# Patient Record
Sex: FEMALE | Race: AMERICAN INDIAN OR ALASKA NATIVE | NOT HISPANIC OR LATINO | Employment: UNEMPLOYED | RURAL
[De-identification: names, ages, dates, MRNs, and addresses within clinical notes are randomized per-mention and may not be internally consistent; named-entity substitution may affect disease eponyms.]

---

## 2021-07-13 ENCOUNTER — PROCEDURE VISIT (OUTPATIENT)
Dept: OBSTETRICS AND GYNECOLOGY | Facility: CLINIC | Age: 31
End: 2021-07-13
Payer: MEDICAID

## 2021-07-13 ENCOUNTER — OFFICE VISIT (OUTPATIENT)
Dept: OBSTETRICS AND GYNECOLOGY | Facility: CLINIC | Age: 31
End: 2021-07-13
Payer: MEDICAID

## 2021-07-13 VITALS
DIASTOLIC BLOOD PRESSURE: 83 MMHG | BODY MASS INDEX: 36.36 KG/M2 | SYSTOLIC BLOOD PRESSURE: 130 MMHG | WEIGHT: 254 LBS | TEMPERATURE: 98 F | HEART RATE: 85 BPM | RESPIRATION RATE: 18 BRPM | HEIGHT: 70 IN | OXYGEN SATURATION: 99 %

## 2021-07-13 DIAGNOSIS — R93.89 THICKENED ENDOMETRIUM: Primary | ICD-10-CM

## 2021-07-13 DIAGNOSIS — R10.2 PELVIC PAIN: ICD-10-CM

## 2021-07-13 DIAGNOSIS — R10.2 PELVIC PAIN: Primary | ICD-10-CM

## 2021-07-13 DIAGNOSIS — N76.0 ACUTE VAGINITIS: ICD-10-CM

## 2021-07-13 LAB
CANDIDA SPECIES: NEGATIVE
GARDNERELLA: NEGATIVE
TRICHOMONAS: NEGATIVE

## 2021-07-13 PROCEDURE — 87480 BACTERIAL VAGINOSIS: ICD-10-PCS | Mod: ,,, | Performed by: CLINICAL MEDICAL LABORATORY

## 2021-07-13 PROCEDURE — 87480 CANDIDA DNA DIR PROBE: CPT | Mod: ,,, | Performed by: CLINICAL MEDICAL LABORATORY

## 2021-07-13 PROCEDURE — 76856 US EXAM PELVIC COMPLETE: CPT | Mod: ,,, | Performed by: OBSTETRICS & GYNECOLOGY

## 2021-07-13 PROCEDURE — 87660 TRICHOMONAS VAGIN DIR PROBE: CPT | Mod: ,,, | Performed by: CLINICAL MEDICAL LABORATORY

## 2021-07-13 PROCEDURE — 76856 PR  ECHO,PELVIC (NONOBSTETRIC): ICD-10-PCS | Mod: ,,, | Performed by: OBSTETRICS & GYNECOLOGY

## 2021-07-13 PROCEDURE — 58100 BIOPSY (GYNECOLOGICAL): ICD-10-PCS | Mod: ,,, | Performed by: ADVANCED PRACTICE MIDWIFE

## 2021-07-13 PROCEDURE — 58100 BIOPSY OF UTERUS LINING: CPT | Mod: ,,, | Performed by: ADVANCED PRACTICE MIDWIFE

## 2021-07-13 PROCEDURE — 87510 GARDNER VAG DNA DIR PROBE: CPT | Mod: ,,, | Performed by: CLINICAL MEDICAL LABORATORY

## 2021-07-13 PROCEDURE — 99499 UNLISTED E&M SERVICE: CPT | Mod: ,,, | Performed by: OBSTETRICS & GYNECOLOGY

## 2021-07-13 PROCEDURE — 87491 CHLAMYDIA/GONORRHOEAE(GC), PCR: ICD-10-PCS | Mod: ,,, | Performed by: CLINICAL MEDICAL LABORATORY

## 2021-07-13 PROCEDURE — 88305 TISSUE EXAM BY PATHOLOGIST: CPT | Mod: SUR | Performed by: ADVANCED PRACTICE MIDWIFE

## 2021-07-13 PROCEDURE — 87510 BACTERIAL VAGINOSIS: ICD-10-PCS | Mod: ,,, | Performed by: CLINICAL MEDICAL LABORATORY

## 2021-07-13 PROCEDURE — 87591 CHLAMYDIA/GONORRHOEAE(GC), PCR: ICD-10-PCS | Mod: ,,, | Performed by: CLINICAL MEDICAL LABORATORY

## 2021-07-13 PROCEDURE — 87591 N.GONORRHOEAE DNA AMP PROB: CPT | Mod: ,,, | Performed by: CLINICAL MEDICAL LABORATORY

## 2021-07-13 PROCEDURE — 99499 NO LOS: ICD-10-PCS | Mod: ,,, | Performed by: OBSTETRICS & GYNECOLOGY

## 2021-07-13 PROCEDURE — 88305 SURGICAL PATHOLOGY: ICD-10-PCS | Mod: 26,,, | Performed by: PATHOLOGY

## 2021-07-13 PROCEDURE — 87660 BACTERIAL VAGINOSIS: ICD-10-PCS | Mod: ,,, | Performed by: CLINICAL MEDICAL LABORATORY

## 2021-07-13 PROCEDURE — 87491 CHLMYD TRACH DNA AMP PROBE: CPT | Mod: ,,, | Performed by: CLINICAL MEDICAL LABORATORY

## 2021-07-13 PROCEDURE — 99204 PR OFFICE/OUTPT VISIT, NEW, LEVL IV, 45-59 MIN: ICD-10-PCS | Mod: 25,,, | Performed by: ADVANCED PRACTICE MIDWIFE

## 2021-07-13 PROCEDURE — 99204 OFFICE O/P NEW MOD 45 MIN: CPT | Mod: 25,,, | Performed by: ADVANCED PRACTICE MIDWIFE

## 2021-07-13 PROCEDURE — 88305 TISSUE EXAM BY PATHOLOGIST: CPT | Mod: 26,,, | Performed by: PATHOLOGY

## 2021-07-13 RX ORDER — ESCITALOPRAM OXALATE 10 MG/1
10 TABLET ORAL DAILY
COMMUNITY
End: 2021-10-11 | Stop reason: SDUPTHER

## 2021-07-13 RX ORDER — OMEPRAZOLE 20 MG/1
20 CAPSULE, DELAYED RELEASE ORAL DAILY
COMMUNITY
Start: 2021-06-07 | End: 2021-10-11 | Stop reason: SDUPTHER

## 2021-07-13 RX ORDER — LISINOPRIL 20 MG/1
20 TABLET ORAL DAILY
COMMUNITY
Start: 2021-02-17 | End: 2021-10-11 | Stop reason: SDUPTHER

## 2021-07-13 RX ORDER — DOXYCYCLINE 100 MG/1
100 CAPSULE ORAL 2 TIMES DAILY
Qty: 20 CAPSULE | Refills: 0 | Status: ON HOLD | OUTPATIENT
Start: 2021-07-13 | End: 2021-09-01

## 2021-07-13 RX ORDER — AZITHROMYCIN 500 MG/1
1000 TABLET, FILM COATED ORAL ONCE
Qty: 2 TABLET | Refills: 0 | Status: SHIPPED | OUTPATIENT
Start: 2021-07-13 | End: 2021-07-13

## 2021-07-15 LAB
CHLAMYDIA BY PCR: NEGATIVE
ESTROGEN SERPL-MCNC: NORMAL PG/ML
LAB AP CLINICAL INFORMATION: NORMAL
LAB AP GROSS DESCRIPTION: NORMAL
LAB AP LABORATORY NOTES: NORMAL
N. GONORRHOEAE (GC) BY PCR: NEGATIVE
T3RU NFR SERPL: NORMAL %

## 2021-08-10 ENCOUNTER — TELEPHONE (OUTPATIENT)
Dept: OBSTETRICS AND GYNECOLOGY | Facility: CLINIC | Age: 31
End: 2021-08-10

## 2021-08-10 ENCOUNTER — OFFICE VISIT (OUTPATIENT)
Dept: OBSTETRICS AND GYNECOLOGY | Facility: CLINIC | Age: 31
End: 2021-08-10
Payer: MEDICAID

## 2021-08-10 VITALS
RESPIRATION RATE: 18 BRPM | DIASTOLIC BLOOD PRESSURE: 93 MMHG | OXYGEN SATURATION: 99 % | BODY MASS INDEX: 35.22 KG/M2 | SYSTOLIC BLOOD PRESSURE: 133 MMHG | HEART RATE: 72 BPM | HEIGHT: 70 IN | TEMPERATURE: 98 F | WEIGHT: 246 LBS

## 2021-08-10 DIAGNOSIS — R10.2 PELVIC PAIN: Primary | ICD-10-CM

## 2021-08-10 DIAGNOSIS — N92.0 MENORRHAGIA WITH REGULAR CYCLE: ICD-10-CM

## 2021-08-10 DIAGNOSIS — N94.10 DYSPAREUNIA, FEMALE: ICD-10-CM

## 2021-08-10 PROCEDURE — 99214 OFFICE O/P EST MOD 30 MIN: CPT | Mod: ,,, | Performed by: OBSTETRICS & GYNECOLOGY

## 2021-08-10 PROCEDURE — 99214 PR OFFICE/OUTPT VISIT, EST, LEVL IV, 30-39 MIN: ICD-10-PCS | Mod: ,,, | Performed by: OBSTETRICS & GYNECOLOGY

## 2021-08-10 RX ORDER — SODIUM CHLORIDE 9 MG/ML
INJECTION, SOLUTION INTRAVENOUS CONTINUOUS
Status: CANCELLED | OUTPATIENT
Start: 2021-08-10

## 2021-08-27 ENCOUNTER — OFFICE VISIT (OUTPATIENT)
Dept: CARDIOLOGY | Facility: CLINIC | Age: 31
End: 2021-08-27
Payer: MEDICAID

## 2021-08-27 VITALS
SYSTOLIC BLOOD PRESSURE: 104 MMHG | HEART RATE: 82 BPM | OXYGEN SATURATION: 99 % | HEIGHT: 70 IN | BODY MASS INDEX: 35.07 KG/M2 | WEIGHT: 245 LBS | DIASTOLIC BLOOD PRESSURE: 80 MMHG

## 2021-08-27 DIAGNOSIS — I10 ESSENTIAL HYPERTENSION: Primary | ICD-10-CM

## 2021-08-27 DIAGNOSIS — R06.02 SHORTNESS OF BREATH: ICD-10-CM

## 2021-08-27 PROCEDURE — 99213 OFFICE O/P EST LOW 20 MIN: CPT | Mod: PBBFAC | Performed by: INTERNAL MEDICINE

## 2021-08-27 PROCEDURE — 99214 OFFICE O/P EST MOD 30 MIN: CPT | Mod: S$PBB,,, | Performed by: INTERNAL MEDICINE

## 2021-08-27 PROCEDURE — 93005 ELECTROCARDIOGRAM TRACING: CPT | Mod: PBBFAC | Performed by: INTERNAL MEDICINE

## 2021-08-27 PROCEDURE — 99214 PR OFFICE/OUTPT VISIT, EST, LEVL IV, 30-39 MIN: ICD-10-PCS | Mod: S$PBB,,, | Performed by: INTERNAL MEDICINE

## 2021-08-27 PROCEDURE — 93010 EKG 12-LEAD: ICD-10-PCS | Mod: S$PBB,,, | Performed by: INTERNAL MEDICINE

## 2021-08-27 PROCEDURE — 93010 ELECTROCARDIOGRAM REPORT: CPT | Mod: S$PBB,,, | Performed by: INTERNAL MEDICINE

## 2021-08-31 ENCOUNTER — TELEPHONE (OUTPATIENT)
Dept: OBSTETRICS AND GYNECOLOGY | Facility: CLINIC | Age: 31
End: 2021-08-31

## 2021-08-31 ENCOUNTER — HOSPITAL ENCOUNTER (OUTPATIENT)
Dept: CARDIOLOGY | Facility: HOSPITAL | Age: 31
Discharge: HOME | End: 2021-08-31
Attending: INTERNAL MEDICINE
Payer: MEDICAID

## 2021-08-31 VITALS — WEIGHT: 245 LBS | HEIGHT: 70 IN | BODY MASS INDEX: 35.07 KG/M2

## 2021-08-31 DIAGNOSIS — R93.89 THICKENED ENDOMETRIUM: Primary | ICD-10-CM

## 2021-08-31 DIAGNOSIS — R06.02 SHORTNESS OF BREATH: ICD-10-CM

## 2021-08-31 LAB
AV INDEX (PROSTH): 0.7
AV MEAN GRADIENT: 4 MMHG
AV VALVE AREA: 2.04 CM2
BSA FOR ECHO PROCEDURE: 2.34 M2
CV ECHO LV RWT: 0.35 CM
DOP CALC AO VTI: 26.09 CM
DOP CALC LVOT AREA: 2.9 CM2
DOP CALC LVOT DIAMETER: 1.93 CM
DOP CALC LVOT STROKE VOLUME: 53.19 CM3
DOP CALCLVOT PEAK VEL VTI: 18.19 CM
E WAVE DECELERATION TIME: 229 MSEC
E/A RATIO: 1.54
E/E' RATIO: 6.06 M/S
ECHO LV POSTERIOR WALL: 0.8 CM (ref 0.6–1.1)
EJECTION FRACTION: 55 %
FRACTIONAL SHORTENING: 34 % (ref 28–44)
INTERVENTRICULAR SEPTUM: 0.84 CM (ref 0.6–1.1)
LEFT INTERNAL DIMENSION IN SYSTOLE: 3.01 CM (ref 2.1–4)
LEFT VENTRICLE MASS INDEX: 52 G/M2
LEFT VENTRICULAR INTERNAL DIMENSION IN DIASTOLE: 4.55 CM (ref 3.5–6)
LEFT VENTRICULAR MASS: 119.56 G
LV LATERAL E/E' RATIO: 5.58 M/S
LV SEPTAL E/E' RATIO: 6.63 M/S
LVOT MG: 2 MMHG
MV PEAK A VEL: 0.69 M/S
MV PEAK E VEL: 1.06 M/S
RA PRESSURE: 3 MMHG
RIGHT VENTRICULAR END-DIASTOLIC DIMENSION: 2.61 CM
TDI LATERAL: 0.19 M/S
TDI SEPTAL: 0.16 M/S
TDI: 0.18 M/S
TRICUSPID ANNULAR PLANE SYSTOLIC EXCURSION: 2.32 CM

## 2021-08-31 PROCEDURE — 25500020 PHARM REV CODE 255: Performed by: INTERNAL MEDICINE

## 2021-08-31 PROCEDURE — 93306 ECHO (CUPID ONLY): ICD-10-PCS | Mod: 26,,, | Performed by: INTERNAL MEDICINE

## 2021-08-31 PROCEDURE — C8929 TTE W OR WO FOL WCON,DOPPLER: HCPCS

## 2021-08-31 PROCEDURE — 93306 TTE W/DOPPLER COMPLETE: CPT | Mod: 26,,, | Performed by: INTERNAL MEDICINE

## 2021-08-31 RX ADMIN — PERFLUTREN 1.5 ML: 6.52 INJECTION, SUSPENSION INTRAVENOUS at 09:08

## 2021-09-01 ENCOUNTER — ANESTHESIA (OUTPATIENT)
Dept: SURGERY | Facility: HOSPITAL | Age: 31
End: 2021-09-01
Payer: MEDICAID

## 2021-09-01 ENCOUNTER — HOSPITAL ENCOUNTER (OUTPATIENT)
Facility: HOSPITAL | Age: 31
Discharge: HOME | End: 2021-09-01
Attending: OBSTETRICS & GYNECOLOGY | Admitting: OBSTETRICS & GYNECOLOGY
Payer: MEDICAID

## 2021-09-01 ENCOUNTER — ANESTHESIA EVENT (OUTPATIENT)
Dept: SURGERY | Facility: HOSPITAL | Age: 31
End: 2021-09-01
Payer: MEDICAID

## 2021-09-01 VITALS
HEART RATE: 63 BPM | DIASTOLIC BLOOD PRESSURE: 92 MMHG | BODY MASS INDEX: 34.93 KG/M2 | SYSTOLIC BLOOD PRESSURE: 148 MMHG | TEMPERATURE: 98 F | HEIGHT: 70 IN | OXYGEN SATURATION: 100 % | RESPIRATION RATE: 18 BRPM | WEIGHT: 244 LBS

## 2021-09-01 DIAGNOSIS — N94.10 DYSPAREUNIA, FEMALE: ICD-10-CM

## 2021-09-01 DIAGNOSIS — R10.2 PELVIC PAIN: ICD-10-CM

## 2021-09-01 DIAGNOSIS — Z90.710 S/P LAPAROSCOPIC ASSISTED VAGINAL HYSTERECTOMY (LAVH): Primary | ICD-10-CM

## 2021-09-01 DIAGNOSIS — N92.0 MENORRHAGIA WITH REGULAR CYCLE: ICD-10-CM

## 2021-09-01 LAB
BASOPHILS # BLD AUTO: 0.03 K/UL (ref 0–0.2)
BASOPHILS NFR BLD AUTO: 0.2 % (ref 0–1)
DIFFERENTIAL METHOD BLD: ABNORMAL
EOSINOPHIL # BLD AUTO: 0.01 K/UL (ref 0–0.5)
EOSINOPHIL NFR BLD AUTO: 0.1 % (ref 1–4)
ERYTHROCYTE [DISTWIDTH] IN BLOOD BY AUTOMATED COUNT: 14.3 % (ref 11.5–14.5)
HCT VFR BLD AUTO: 37.9 % (ref 38–47)
HGB BLD-MCNC: 12.2 G/DL (ref 12–16)
IMM GRANULOCYTES # BLD AUTO: 0.07 K/UL (ref 0–0.04)
IMM GRANULOCYTES NFR BLD: 0.5 % (ref 0–0.4)
LYMPHOCYTES # BLD AUTO: 1.7 K/UL (ref 1–4.8)
LYMPHOCYTES NFR BLD AUTO: 11.2 % (ref 27–41)
MCH RBC QN AUTO: 24.9 PG (ref 27–31)
MCHC RBC AUTO-ENTMCNC: 32.2 G/DL (ref 32–36)
MCV RBC AUTO: 77.3 FL (ref 80–96)
MONOCYTES # BLD AUTO: 0.7 K/UL (ref 0–0.8)
MONOCYTES NFR BLD AUTO: 4.6 % (ref 2–6)
MPC BLD CALC-MCNC: 9.9 FL (ref 9.4–12.4)
NEUTROPHILS # BLD AUTO: 12.64 K/UL (ref 1.8–7.7)
NEUTROPHILS NFR BLD AUTO: 83.4 % (ref 53–65)
NRBC # BLD AUTO: 0 X10E3/UL
NRBC, AUTO (.00): 0 %
PLATELET # BLD AUTO: 309 K/UL (ref 150–400)
RBC # BLD AUTO: 4.9 M/UL (ref 4.2–5.4)
WBC # BLD AUTO: 15.15 K/UL (ref 4.5–11)

## 2021-09-01 PROCEDURE — 88307 TISSUE EXAM BY PATHOLOGIST: CPT | Mod: 26,,, | Performed by: PATHOLOGY

## 2021-09-01 PROCEDURE — 36000710: Performed by: OBSTETRICS & GYNECOLOGY

## 2021-09-01 PROCEDURE — 85025 COMPLETE CBC W/AUTO DIFF WBC: CPT | Performed by: OBSTETRICS & GYNECOLOGY

## 2021-09-01 PROCEDURE — 25000003 PHARM REV CODE 250: Performed by: OBSTETRICS & GYNECOLOGY

## 2021-09-01 PROCEDURE — 27000165 HC TUBE, ETT CUFFED: Performed by: ANESTHESIOLOGY

## 2021-09-01 PROCEDURE — 37000008 HC ANESTHESIA 1ST 15 MINUTES: Performed by: OBSTETRICS & GYNECOLOGY

## 2021-09-01 PROCEDURE — 37000009 HC ANESTHESIA EA ADD 15 MINS: Performed by: OBSTETRICS & GYNECOLOGY

## 2021-09-01 PROCEDURE — 25000003 PHARM REV CODE 250: Performed by: ANESTHESIOLOGY

## 2021-09-01 PROCEDURE — 88307 TISSUE EXAM BY PATHOLOGIST: CPT | Mod: SUR | Performed by: OBSTETRICS & GYNECOLOGY

## 2021-09-01 PROCEDURE — 58262 VAG HYST INCLUDING T/O: CPT | Mod: ,,, | Performed by: OBSTETRICS & GYNECOLOGY

## 2021-09-01 PROCEDURE — 36415 COLL VENOUS BLD VENIPUNCTURE: CPT | Performed by: OBSTETRICS & GYNECOLOGY

## 2021-09-01 PROCEDURE — 71000015 HC POSTOP RECOV 1ST HR: Performed by: OBSTETRICS & GYNECOLOGY

## 2021-09-01 PROCEDURE — 63600175 PHARM REV CODE 636 W HCPCS: Performed by: ANESTHESIOLOGY

## 2021-09-01 PROCEDURE — 27000260 *HC AIRWAY ORAL: Performed by: ANESTHESIOLOGY

## 2021-09-01 PROCEDURE — 27201423 OPTIME MED/SURG SUP & DEVICES STERILE SUPPLY: Performed by: OBSTETRICS & GYNECOLOGY

## 2021-09-01 PROCEDURE — C1729 CATH, DRAINAGE: HCPCS | Performed by: OBSTETRICS & GYNECOLOGY

## 2021-09-01 PROCEDURE — D9220A PRA ANESTHESIA: Mod: ANES,,, | Performed by: ANESTHESIOLOGY

## 2021-09-01 PROCEDURE — 88307 SURGICAL PATHOLOGY: ICD-10-PCS | Mod: 26,,, | Performed by: PATHOLOGY

## 2021-09-01 PROCEDURE — 27000689 HC BLADE LARYNGOSCOPE ANY SIZE: Performed by: ANESTHESIOLOGY

## 2021-09-01 PROCEDURE — 36000711: Performed by: OBSTETRICS & GYNECOLOGY

## 2021-09-01 PROCEDURE — D9220A PRA ANESTHESIA: ICD-10-PCS | Mod: CRNA,,, | Performed by: ANESTHESIOLOGY

## 2021-09-01 PROCEDURE — D9220A PRA ANESTHESIA: ICD-10-PCS | Mod: ANES,,, | Performed by: ANESTHESIOLOGY

## 2021-09-01 PROCEDURE — 71000033 HC RECOVERY, INTIAL HOUR: Performed by: OBSTETRICS & GYNECOLOGY

## 2021-09-01 PROCEDURE — 71000016 HC POSTOP RECOV ADDL HR: Performed by: OBSTETRICS & GYNECOLOGY

## 2021-09-01 PROCEDURE — 58262 PR VAG HYST,RMV TUBE/OVARY: ICD-10-PCS | Mod: ,,, | Performed by: OBSTETRICS & GYNECOLOGY

## 2021-09-01 PROCEDURE — D9220A PRA ANESTHESIA: Mod: CRNA,,, | Performed by: ANESTHESIOLOGY

## 2021-09-01 PROCEDURE — 27000716 HC OXISENSOR PROBE, ANY SIZE: Performed by: ANESTHESIOLOGY

## 2021-09-01 RX ORDER — DIPHENHYDRAMINE HYDROCHLORIDE 50 MG/ML
25 INJECTION INTRAMUSCULAR; INTRAVENOUS EVERY 6 HOURS PRN
Status: DISCONTINUED | OUTPATIENT
Start: 2021-09-01 | End: 2021-09-01 | Stop reason: HOSPADM

## 2021-09-01 RX ORDER — MORPHINE SULFATE 10 MG/ML
3 INJECTION INTRAMUSCULAR; INTRAVENOUS; SUBCUTANEOUS
Status: DISCONTINUED | OUTPATIENT
Start: 2021-09-01 | End: 2021-09-01 | Stop reason: HOSPADM

## 2021-09-01 RX ORDER — SODIUM CHLORIDE, SODIUM LACTATE, POTASSIUM CHLORIDE, CALCIUM CHLORIDE 600; 310; 30; 20 MG/100ML; MG/100ML; MG/100ML; MG/100ML
INJECTION, SOLUTION INTRAVENOUS CONTINUOUS
Status: DISCONTINUED | OUTPATIENT
Start: 2021-09-01 | End: 2021-09-01 | Stop reason: HOSPADM

## 2021-09-01 RX ORDER — ONDANSETRON 2 MG/ML
4 INJECTION INTRAMUSCULAR; INTRAVENOUS DAILY PRN
Status: DISCONTINUED | OUTPATIENT
Start: 2021-09-01 | End: 2021-09-01 | Stop reason: HOSPADM

## 2021-09-01 RX ORDER — FENTANYL CITRATE 50 UG/ML
INJECTION, SOLUTION INTRAMUSCULAR; INTRAVENOUS
Status: DISCONTINUED | OUTPATIENT
Start: 2021-09-01 | End: 2021-09-01

## 2021-09-01 RX ORDER — PROCHLORPERAZINE EDISYLATE 5 MG/ML
5 INJECTION INTRAMUSCULAR; INTRAVENOUS EVERY 6 HOURS PRN
Status: DISCONTINUED | OUTPATIENT
Start: 2021-09-01 | End: 2021-09-01 | Stop reason: HOSPADM

## 2021-09-01 RX ORDER — SODIUM CHLORIDE 9 MG/ML
INJECTION, SOLUTION INTRAVENOUS CONTINUOUS
Status: DISCONTINUED | OUTPATIENT
Start: 2021-09-01 | End: 2021-09-01 | Stop reason: HOSPADM

## 2021-09-01 RX ORDER — SIMETHICONE 80 MG
80 TABLET,CHEWABLE ORAL EVERY 4 HOURS PRN
Status: DISCONTINUED | OUTPATIENT
Start: 2021-09-01 | End: 2021-09-01 | Stop reason: HOSPADM

## 2021-09-01 RX ORDER — HYDROMORPHONE HYDROCHLORIDE 2 MG/ML
0.5 INJECTION, SOLUTION INTRAMUSCULAR; INTRAVENOUS; SUBCUTANEOUS EVERY 5 MIN PRN
Status: DISCONTINUED | OUTPATIENT
Start: 2021-09-01 | End: 2021-09-01 | Stop reason: HOSPADM

## 2021-09-01 RX ORDER — CEFAZOLIN SODIUM 2 G/50ML
2 SOLUTION INTRAVENOUS
Status: DISCONTINUED | OUTPATIENT
Start: 2021-09-01 | End: 2021-09-01 | Stop reason: HOSPADM

## 2021-09-01 RX ORDER — ROCURONIUM BROMIDE 50 MG/5 ML
SYRINGE (ML) INTRAVENOUS
Status: DISCONTINUED | OUTPATIENT
Start: 2021-09-01 | End: 2021-09-01

## 2021-09-01 RX ORDER — PROPOFOL 10 MG/ML
VIAL (ML) INTRAVENOUS
Status: DISCONTINUED | OUTPATIENT
Start: 2021-09-01 | End: 2021-09-01

## 2021-09-01 RX ORDER — MIDAZOLAM HYDROCHLORIDE 1 MG/ML
INJECTION INTRAMUSCULAR; INTRAVENOUS
Status: DISCONTINUED | OUTPATIENT
Start: 2021-09-01 | End: 2021-09-01

## 2021-09-01 RX ORDER — HYDROCODONE BITARTRATE AND ACETAMINOPHEN 5; 325 MG/1; MG/1
1 TABLET ORAL EVERY 4 HOURS PRN
Status: DISCONTINUED | OUTPATIENT
Start: 2021-09-01 | End: 2021-09-01 | Stop reason: HOSPADM

## 2021-09-01 RX ORDER — DIPHENHYDRAMINE HCL 25 MG
25 CAPSULE ORAL EVERY 4 HOURS PRN
Status: DISCONTINUED | OUTPATIENT
Start: 2021-09-01 | End: 2021-09-01 | Stop reason: HOSPADM

## 2021-09-01 RX ORDER — MORPHINE SULFATE 10 MG/ML
4 INJECTION INTRAMUSCULAR; INTRAVENOUS; SUBCUTANEOUS EVERY 5 MIN PRN
Status: DISCONTINUED | OUTPATIENT
Start: 2021-09-01 | End: 2021-09-01 | Stop reason: HOSPADM

## 2021-09-01 RX ORDER — DEXAMETHASONE SODIUM PHOSPHATE 4 MG/ML
INJECTION, SOLUTION INTRA-ARTICULAR; INTRALESIONAL; INTRAMUSCULAR; INTRAVENOUS; SOFT TISSUE
Status: DISCONTINUED | OUTPATIENT
Start: 2021-09-01 | End: 2021-09-01

## 2021-09-01 RX ORDER — ONDANSETRON 4 MG/1
8 TABLET, ORALLY DISINTEGRATING ORAL EVERY 8 HOURS PRN
Status: DISCONTINUED | OUTPATIENT
Start: 2021-09-01 | End: 2021-09-01 | Stop reason: HOSPADM

## 2021-09-01 RX ORDER — VASOPRESSIN 20 [USP'U]/ML
INJECTION, SOLUTION INTRAMUSCULAR; SUBCUTANEOUS
Status: DISCONTINUED | OUTPATIENT
Start: 2021-09-01 | End: 2021-09-01 | Stop reason: HOSPADM

## 2021-09-01 RX ORDER — LIDOCAINE HYDROCHLORIDE 20 MG/ML
INJECTION, SOLUTION EPIDURAL; INFILTRATION; INTRACAUDAL; PERINEURAL
Status: DISCONTINUED | OUTPATIENT
Start: 2021-09-01 | End: 2021-09-01

## 2021-09-01 RX ORDER — GLYCOPYRROLATE 0.2 MG/ML
INJECTION INTRAMUSCULAR; INTRAVENOUS
Status: DISCONTINUED | OUTPATIENT
Start: 2021-09-01 | End: 2021-09-01

## 2021-09-01 RX ORDER — MUPIROCIN 20 MG/G
1 OINTMENT TOPICAL 2 TIMES DAILY
Status: DISCONTINUED | OUTPATIENT
Start: 2021-09-01 | End: 2021-09-01 | Stop reason: HOSPADM

## 2021-09-01 RX ORDER — ONDANSETRON 2 MG/ML
INJECTION INTRAMUSCULAR; INTRAVENOUS
Status: DISCONTINUED | OUTPATIENT
Start: 2021-09-01 | End: 2021-09-01

## 2021-09-01 RX ORDER — MEPERIDINE HYDROCHLORIDE 25 MG/ML
25 INJECTION INTRAMUSCULAR; INTRAVENOUS; SUBCUTANEOUS EVERY 10 MIN PRN
Status: DISCONTINUED | OUTPATIENT
Start: 2021-09-01 | End: 2021-09-01 | Stop reason: HOSPADM

## 2021-09-01 RX ORDER — SCOLOPAMINE TRANSDERMAL SYSTEM 1 MG/1
1 PATCH, EXTENDED RELEASE TRANSDERMAL ONCE
Status: DISCONTINUED | OUTPATIENT
Start: 2021-09-01 | End: 2021-09-01 | Stop reason: HOSPADM

## 2021-09-01 RX ORDER — NEOSTIGMINE METHYLSULFATE 1 MG/ML
INJECTION, SOLUTION INTRAVENOUS
Status: DISCONTINUED | OUTPATIENT
Start: 2021-09-01 | End: 2021-09-01

## 2021-09-01 RX ORDER — CEFAZOLIN SODIUM 1 G/3ML
INJECTION, POWDER, FOR SOLUTION INTRAMUSCULAR; INTRAVENOUS
Status: DISCONTINUED | OUTPATIENT
Start: 2021-09-01 | End: 2021-09-01

## 2021-09-01 RX ORDER — HYDROCODONE BITARTRATE AND ACETAMINOPHEN 7.5; 325 MG/1; MG/1
1 TABLET ORAL EVERY 6 HOURS PRN
Qty: 28 TABLET | Refills: 0 | Status: SHIPPED | OUTPATIENT
Start: 2021-09-01 | End: 2022-05-16 | Stop reason: ALTCHOICE

## 2021-09-01 RX ADMIN — NEOSTIGMINE METHYLSULFATE 2 MG: 1 INJECTION INTRAVENOUS at 12:09

## 2021-09-01 RX ADMIN — SODIUM CHLORIDE: 9 INJECTION, SOLUTION INTRAVENOUS at 09:09

## 2021-09-01 RX ADMIN — MORPHINE SULFATE 4 MG: 10 INJECTION INTRAVENOUS at 12:09

## 2021-09-01 RX ADMIN — MORPHINE SULFATE 2 MG: 10 INJECTION INTRAVENOUS at 12:09

## 2021-09-01 RX ADMIN — MIDAZOLAM 2 MG: 1 INJECTION INTRAMUSCULAR; INTRAVENOUS at 09:09

## 2021-09-01 RX ADMIN — ONDANSETRON 4 MG: 2 INJECTION INTRAMUSCULAR; INTRAVENOUS at 09:09

## 2021-09-01 RX ADMIN — FENTANYL CITRATE 50 MCG: 50 INJECTION INTRAMUSCULAR; INTRAVENOUS at 10:09

## 2021-09-01 RX ADMIN — GLYCOPYRROLATE 0.4 MG: 0.2 INJECTION INTRAMUSCULAR; INTRAVENOUS at 12:09

## 2021-09-01 RX ADMIN — CEFAZOLIN 2000 MG: 1 INJECTION, POWDER, FOR SOLUTION INTRAMUSCULAR; INTRAVENOUS; PARENTERAL at 09:09

## 2021-09-01 RX ADMIN — Medication 10 MG: at 10:09

## 2021-09-01 RX ADMIN — PROPOFOL 150 MG: 10 INJECTION, EMULSION INTRAVENOUS at 09:09

## 2021-09-01 RX ADMIN — Medication 40 MG: at 09:09

## 2021-09-01 RX ADMIN — DEXAMETHASONE SODIUM PHOSPHATE 4 MG: 4 INJECTION, SOLUTION INTRA-ARTICULAR; INTRALESIONAL; INTRAMUSCULAR; INTRAVENOUS; SOFT TISSUE at 09:09

## 2021-09-01 RX ADMIN — FENTANYL CITRATE 100 MCG: 50 INJECTION INTRAMUSCULAR; INTRAVENOUS at 09:09

## 2021-09-01 RX ADMIN — LIDOCAINE HYDROCHLORIDE 100 MG: 20 INJECTION, SOLUTION INTRAVENOUS at 09:09

## 2021-09-01 RX ADMIN — SCOPALAMINE 1 PATCH: 1 PATCH, EXTENDED RELEASE TRANSDERMAL at 08:09

## 2021-09-02 LAB
DHEA SERPL-MCNC: NORMAL
ESTROGEN SERPL-MCNC: NORMAL PG/ML
LAB AP GROSS DESCRIPTION: NORMAL
LAB AP LABORATORY NOTES: NORMAL
T3RU NFR SERPL: NORMAL %

## 2021-09-15 ENCOUNTER — OFFICE VISIT (OUTPATIENT)
Dept: OBSTETRICS AND GYNECOLOGY | Facility: CLINIC | Age: 31
End: 2021-09-15
Payer: MEDICAID

## 2021-09-15 VITALS
DIASTOLIC BLOOD PRESSURE: 82 MMHG | OXYGEN SATURATION: 99 % | BODY MASS INDEX: 35.07 KG/M2 | WEIGHT: 245 LBS | HEIGHT: 70 IN | SYSTOLIC BLOOD PRESSURE: 125 MMHG | HEART RATE: 66 BPM | RESPIRATION RATE: 18 BRPM | TEMPERATURE: 98 F

## 2021-09-15 DIAGNOSIS — Z90.710 STATUS POST HYSTERECTOMY: Primary | ICD-10-CM

## 2021-09-15 PROCEDURE — 99024 POSTOP FOLLOW-UP VISIT: CPT | Mod: ,,, | Performed by: ADVANCED PRACTICE MIDWIFE

## 2021-09-15 PROCEDURE — 99024 PR POST-OP FOLLOW-UP VISIT: ICD-10-PCS | Mod: ,,, | Performed by: ADVANCED PRACTICE MIDWIFE

## 2021-10-04 ENCOUNTER — OFFICE VISIT (OUTPATIENT)
Dept: OBSTETRICS AND GYNECOLOGY | Facility: CLINIC | Age: 31
End: 2021-10-04
Payer: MEDICAID

## 2021-10-04 VITALS
WEIGHT: 241 LBS | HEIGHT: 70 IN | HEART RATE: 72 BPM | DIASTOLIC BLOOD PRESSURE: 78 MMHG | BODY MASS INDEX: 34.5 KG/M2 | SYSTOLIC BLOOD PRESSURE: 126 MMHG

## 2021-10-04 DIAGNOSIS — Z90.710 STATUS POST HYSTERECTOMY: Primary | ICD-10-CM

## 2021-10-04 PROCEDURE — 99024 PR POST-OP FOLLOW-UP VISIT: ICD-10-PCS | Mod: ,,, | Performed by: OBSTETRICS & GYNECOLOGY

## 2021-10-04 PROCEDURE — 99024 POSTOP FOLLOW-UP VISIT: CPT | Mod: ,,, | Performed by: OBSTETRICS & GYNECOLOGY

## 2021-10-11 ENCOUNTER — OFFICE VISIT (OUTPATIENT)
Dept: FAMILY MEDICINE | Facility: CLINIC | Age: 31
End: 2021-10-11
Payer: MEDICAID

## 2021-10-11 VITALS
WEIGHT: 239 LBS | HEIGHT: 70 IN | HEART RATE: 73 BPM | DIASTOLIC BLOOD PRESSURE: 84 MMHG | SYSTOLIC BLOOD PRESSURE: 120 MMHG | TEMPERATURE: 97 F | BODY MASS INDEX: 34.22 KG/M2

## 2021-10-11 DIAGNOSIS — R63.4 WEIGHT LOSS: ICD-10-CM

## 2021-10-11 DIAGNOSIS — R63.0 DECREASED APPETITE: ICD-10-CM

## 2021-10-11 DIAGNOSIS — Z83.3 FAMILY HISTORY OF DIABETES MELLITUS: ICD-10-CM

## 2021-10-11 DIAGNOSIS — I10 ESSENTIAL HYPERTENSION: Primary | ICD-10-CM

## 2021-10-11 DIAGNOSIS — F41.9 ANXIETY: ICD-10-CM

## 2021-10-11 DIAGNOSIS — K21.9 GASTROESOPHAGEAL REFLUX DISEASE, UNSPECIFIED WHETHER ESOPHAGITIS PRESENT: ICD-10-CM

## 2021-10-11 DIAGNOSIS — F32.A DEPRESSION, UNSPECIFIED DEPRESSION TYPE: ICD-10-CM

## 2021-10-11 LAB
ALBUMIN SERPL BCP-MCNC: 3.4 G/DL (ref 3.5–5)
ALBUMIN/GLOB SERPL: 0.8 {RATIO}
ALP SERPL-CCNC: 84 U/L (ref 37–98)
ALT SERPL W P-5'-P-CCNC: 25 U/L (ref 13–56)
ANION GAP SERPL CALCULATED.3IONS-SCNC: 9 MMOL/L (ref 7–16)
AST SERPL W P-5'-P-CCNC: 18 U/L (ref 15–37)
BASOPHILS # BLD AUTO: 0.05 K/UL (ref 0–0.2)
BASOPHILS NFR BLD AUTO: 0.6 % (ref 0–1)
BILIRUB SERPL-MCNC: 0.5 MG/DL (ref 0–1.2)
BUN SERPL-MCNC: 9 MG/DL (ref 7–18)
BUN/CREAT SERPL: 9 (ref 6–20)
CALCIUM SERPL-MCNC: 9 MG/DL (ref 8.5–10.1)
CHLORIDE SERPL-SCNC: 109 MMOL/L (ref 98–107)
CHOLEST SERPL-MCNC: 158 MG/DL (ref 0–200)
CHOLEST/HDLC SERPL: 5.1 {RATIO}
CO2 SERPL-SCNC: 27 MMOL/L (ref 21–32)
CREAT SERPL-MCNC: 0.96 MG/DL (ref 0.55–1.02)
DIFFERENTIAL METHOD BLD: ABNORMAL
EOSINOPHIL # BLD AUTO: 0.29 K/UL (ref 0–0.5)
EOSINOPHIL NFR BLD AUTO: 3.3 % (ref 1–4)
ERYTHROCYTE [DISTWIDTH] IN BLOOD BY AUTOMATED COUNT: 14 % (ref 11.5–14.5)
EST. AVERAGE GLUCOSE BLD GHB EST-MCNC: 97 MG/DL
GLOBULIN SER-MCNC: 4.1 G/DL (ref 2–4)
GLUCOSE SERPL-MCNC: 94 MG/DL (ref 74–106)
HBA1C MFR BLD HPLC: 5.5 % (ref 4.5–6.6)
HCT VFR BLD AUTO: 40.4 % (ref 38–47)
HDLC SERPL-MCNC: 31 MG/DL (ref 40–60)
HGB BLD-MCNC: 12.5 G/DL (ref 12–16)
IMM GRANULOCYTES # BLD AUTO: 0.02 K/UL (ref 0–0.04)
IMM GRANULOCYTES NFR BLD: 0.2 % (ref 0–0.4)
LDLC SERPL CALC-MCNC: 106 MG/DL
LDLC/HDLC SERPL: 3.4 {RATIO}
LYMPHOCYTES # BLD AUTO: 2.99 K/UL (ref 1–4.8)
LYMPHOCYTES NFR BLD AUTO: 33.9 % (ref 27–41)
MCH RBC QN AUTO: 24.2 PG (ref 27–31)
MCHC RBC AUTO-ENTMCNC: 30.9 G/DL (ref 32–36)
MCV RBC AUTO: 78.3 FL (ref 80–96)
MONOCYTES # BLD AUTO: 0.53 K/UL (ref 0–0.8)
MONOCYTES NFR BLD AUTO: 6 % (ref 2–6)
MPC BLD CALC-MCNC: 11.2 FL (ref 9.4–12.4)
NEUTROPHILS # BLD AUTO: 4.93 K/UL (ref 1.8–7.7)
NEUTROPHILS NFR BLD AUTO: 56 % (ref 53–65)
NONHDLC SERPL-MCNC: 127 MG/DL
NRBC # BLD AUTO: 0 X10E3/UL
NRBC, AUTO (.00): 0 %
PLATELET # BLD AUTO: 314 K/UL (ref 150–400)
POTASSIUM SERPL-SCNC: 4 MMOL/L (ref 3.5–5.1)
PROT SERPL-MCNC: 7.5 G/DL (ref 6.4–8.2)
RBC # BLD AUTO: 5.16 M/UL (ref 4.2–5.4)
SODIUM SERPL-SCNC: 141 MMOL/L (ref 136–145)
TRIGL SERPL-MCNC: 104 MG/DL (ref 35–150)
TSH SERPL DL<=0.005 MIU/L-ACNC: 1.52 UIU/ML (ref 0.36–3.74)
VLDLC SERPL-MCNC: 21 MG/DL
WBC # BLD AUTO: 8.81 K/UL (ref 4.5–11)

## 2021-10-11 PROCEDURE — 80061 LIPID PANEL: ICD-10-PCS | Mod: ,,, | Performed by: CLINICAL MEDICAL LABORATORY

## 2021-10-11 PROCEDURE — 84443 TSH: ICD-10-PCS | Mod: ,,, | Performed by: CLINICAL MEDICAL LABORATORY

## 2021-10-11 PROCEDURE — 80061 LIPID PANEL: CPT | Mod: ,,, | Performed by: CLINICAL MEDICAL LABORATORY

## 2021-10-11 PROCEDURE — 84443 ASSAY THYROID STIM HORMONE: CPT | Mod: ,,, | Performed by: CLINICAL MEDICAL LABORATORY

## 2021-10-11 PROCEDURE — 85025 COMPLETE CBC W/AUTO DIFF WBC: CPT | Mod: ,,, | Performed by: CLINICAL MEDICAL LABORATORY

## 2021-10-11 PROCEDURE — 99214 PR OFFICE/OUTPT VISIT, EST, LEVL IV, 30-39 MIN: ICD-10-PCS | Mod: ,,, | Performed by: NURSE PRACTITIONER

## 2021-10-11 PROCEDURE — 83036 HEMOGLOBIN A1C: ICD-10-PCS | Mod: ,,, | Performed by: CLINICAL MEDICAL LABORATORY

## 2021-10-11 PROCEDURE — 80053 COMPREHENSIVE METABOLIC PANEL: ICD-10-PCS | Mod: ,,, | Performed by: CLINICAL MEDICAL LABORATORY

## 2021-10-11 PROCEDURE — 85025 CBC WITH DIFFERENTIAL: ICD-10-PCS | Mod: ,,, | Performed by: CLINICAL MEDICAL LABORATORY

## 2021-10-11 PROCEDURE — 83036 HEMOGLOBIN GLYCOSYLATED A1C: CPT | Mod: ,,, | Performed by: CLINICAL MEDICAL LABORATORY

## 2021-10-11 PROCEDURE — 99214 OFFICE O/P EST MOD 30 MIN: CPT | Mod: ,,, | Performed by: NURSE PRACTITIONER

## 2021-10-11 PROCEDURE — 80053 COMPREHEN METABOLIC PANEL: CPT | Mod: ,,, | Performed by: CLINICAL MEDICAL LABORATORY

## 2021-10-11 RX ORDER — OMEPRAZOLE 20 MG/1
20 CAPSULE, DELAYED RELEASE ORAL DAILY
Qty: 90 CAPSULE | Refills: 1 | Status: SHIPPED | OUTPATIENT
Start: 2021-10-11 | End: 2022-10-26 | Stop reason: SDUPTHER

## 2021-10-11 RX ORDER — ESCITALOPRAM OXALATE 10 MG/1
10 TABLET ORAL DAILY
Qty: 90 TABLET | Refills: 1 | Status: SHIPPED | OUTPATIENT
Start: 2021-10-11 | End: 2022-10-26 | Stop reason: SDUPTHER

## 2021-10-11 RX ORDER — LISINOPRIL 20 MG/1
20 TABLET ORAL DAILY
Qty: 90 TABLET | Refills: 1 | Status: SHIPPED | OUTPATIENT
Start: 2021-10-11 | End: 2022-10-26 | Stop reason: SDUPTHER

## 2021-10-12 ENCOUNTER — TELEPHONE (OUTPATIENT)
Dept: FAMILY MEDICINE | Facility: CLINIC | Age: 31
End: 2021-10-12

## 2021-12-03 ENCOUNTER — OFFICE VISIT (OUTPATIENT)
Dept: FAMILY MEDICINE | Facility: CLINIC | Age: 31
End: 2021-12-03
Payer: MEDICAID

## 2021-12-03 VITALS
HEIGHT: 70 IN | WEIGHT: 240 LBS | DIASTOLIC BLOOD PRESSURE: 98 MMHG | TEMPERATURE: 98 F | SYSTOLIC BLOOD PRESSURE: 130 MMHG | BODY MASS INDEX: 34.36 KG/M2 | HEART RATE: 114 BPM

## 2021-12-03 DIAGNOSIS — J32.9 SINUSITIS, UNSPECIFIED CHRONICITY, UNSPECIFIED LOCATION: Primary | ICD-10-CM

## 2021-12-03 PROCEDURE — 99213 PR OFFICE/OUTPT VISIT, EST, LEVL III, 20-29 MIN: ICD-10-PCS | Mod: ,,, | Performed by: NURSE PRACTITIONER

## 2021-12-03 PROCEDURE — 99213 OFFICE O/P EST LOW 20 MIN: CPT | Mod: ,,, | Performed by: NURSE PRACTITIONER

## 2021-12-03 RX ORDER — AMOXICILLIN 500 MG/1
500 TABLET, FILM COATED ORAL EVERY 12 HOURS
Qty: 20 TABLET | Refills: 0 | Status: SHIPPED | OUTPATIENT
Start: 2021-12-03 | End: 2021-12-13

## 2022-05-16 ENCOUNTER — OFFICE VISIT (OUTPATIENT)
Dept: FAMILY MEDICINE | Facility: CLINIC | Age: 32
End: 2022-05-16
Payer: MEDICAID

## 2022-05-16 VITALS
WEIGHT: 246 LBS | RESPIRATION RATE: 14 BRPM | BODY MASS INDEX: 34.44 KG/M2 | HEIGHT: 71 IN | HEART RATE: 87 BPM | DIASTOLIC BLOOD PRESSURE: 110 MMHG | SYSTOLIC BLOOD PRESSURE: 150 MMHG

## 2022-05-16 DIAGNOSIS — H61.22 IMPACTED CERUMEN OF LEFT EAR: Primary | ICD-10-CM

## 2022-05-16 PROCEDURE — 4010F ACE/ARB THERAPY RXD/TAKEN: CPT | Mod: CPTII,,, | Performed by: NURSE PRACTITIONER

## 2022-05-16 PROCEDURE — 3077F SYST BP >= 140 MM HG: CPT | Mod: CPTII,,, | Performed by: NURSE PRACTITIONER

## 2022-05-16 PROCEDURE — 1160F RVW MEDS BY RX/DR IN RCRD: CPT | Mod: CPTII,,, | Performed by: NURSE PRACTITIONER

## 2022-05-16 PROCEDURE — 3008F PR BODY MASS INDEX (BMI) DOCUMENTED: ICD-10-PCS | Mod: CPTII,,, | Performed by: NURSE PRACTITIONER

## 2022-05-16 PROCEDURE — 3077F PR MOST RECENT SYSTOLIC BLOOD PRESSURE >= 140 MM HG: ICD-10-PCS | Mod: CPTII,,, | Performed by: NURSE PRACTITIONER

## 2022-05-16 PROCEDURE — 3080F DIAST BP >= 90 MM HG: CPT | Mod: CPTII,,, | Performed by: NURSE PRACTITIONER

## 2022-05-16 PROCEDURE — 69209 REMOVE IMPACTED EAR WAX UNI: CPT | Mod: LT,,, | Performed by: NURSE PRACTITIONER

## 2022-05-16 PROCEDURE — 1159F MED LIST DOCD IN RCRD: CPT | Mod: CPTII,,, | Performed by: NURSE PRACTITIONER

## 2022-05-16 PROCEDURE — 1159F PR MEDICATION LIST DOCUMENTED IN MEDICAL RECORD: ICD-10-PCS | Mod: CPTII,,, | Performed by: NURSE PRACTITIONER

## 2022-05-16 PROCEDURE — 1160F PR REVIEW ALL MEDS BY PRESCRIBER/CLIN PHARMACIST DOCUMENTED: ICD-10-PCS | Mod: CPTII,,, | Performed by: NURSE PRACTITIONER

## 2022-05-16 PROCEDURE — 69209 PR REMOVAL IMPACTED CERUMEN USING IRRIGATION/LAVAGE, UNILATERAL: ICD-10-PCS | Mod: LT,,, | Performed by: NURSE PRACTITIONER

## 2022-05-16 PROCEDURE — 4010F PR ACE/ARB THEARPY RXD/TAKEN: ICD-10-PCS | Mod: CPTII,,, | Performed by: NURSE PRACTITIONER

## 2022-05-16 PROCEDURE — 3080F PR MOST RECENT DIASTOLIC BLOOD PRESSURE >= 90 MM HG: ICD-10-PCS | Mod: CPTII,,, | Performed by: NURSE PRACTITIONER

## 2022-05-16 PROCEDURE — 3008F BODY MASS INDEX DOCD: CPT | Mod: CPTII,,, | Performed by: NURSE PRACTITIONER

## 2022-05-16 NOTE — PROGRESS NOTES
"  Subjective:       Patient ID: Sophy Orozco is a 31 y.o. female.    Chief Complaint: Ear Fullness (Left ear feels full x 1 week)    HPI     Patient presents c/o left ear feeling stopped up; "like I'm in a drum" for the past one week  Denies any ear pain or drainage    Upon initial inspection, left EAC occluded with dark moist cerumen  NP irrigated left ear using warm water; cerumen easily removed  Patient reported instant resolution of clogged sensation    BP (!) 150/110   Pulse 87   Resp 14   Ht 5' 11" (1.803 m)   Wt 111.6 kg (246 lb)   BMI 34.31 kg/m²     Medication List with Changes/Refills   Current Medications    ESCITALOPRAM OXALATE (LEXAPRO) 10 MG TABLET    Take 1 tablet (10 mg total) by mouth once daily.    LISINOPRIL (PRINIVIL,ZESTRIL) 20 MG TABLET    Take 1 tablet (20 mg total) by mouth once daily.    OMEPRAZOLE (PRILOSEC) 20 MG CAPSULE    Take 1 capsule (20 mg total) by mouth once daily.   Discontinued Medications    HYDROCODONE-ACETAMINOPHEN (NORCO) 7.5-325 MG PER TABLET    Take 1 tablet by mouth every 6 (six) hours as needed for Pain.       Review of Systems   Constitutional: Negative for fever.   HENT: Negative for nasal congestion, ear discharge and ear pain (left ear feels clogged).    Respiratory: Negative for cough.    Cardiovascular: Negative for chest pain.   Neurological: Negative for headaches.         Objective:      Physical Exam  Vitals and nursing note reviewed.   Constitutional:       General: She is not in acute distress.     Appearance: Normal appearance.   HENT:      Head: Normocephalic.      Right Ear: Tympanic membrane, ear canal and external ear normal.      Left Ear: Tympanic membrane, ear canal and external ear normal. There is impacted cerumen.      Nose: Nose normal.   Neck:      Thyroid: No thyromegaly.      Trachea: Trachea normal.   Cardiovascular:      Rate and Rhythm: Normal rate and regular rhythm.      Pulses: Normal pulses.      Heart sounds: Normal heart " sounds.   Pulmonary:      Effort: Pulmonary effort is normal.      Breath sounds: Normal breath sounds.   Musculoskeletal:      Cervical back: Neck supple.   Skin:     General: Skin is warm and dry.   Neurological:      General: No focal deficit present.      Mental Status: She is alert and oriented to person, place, and time.   Psychiatric:         Mood and Affect: Mood normal.         Behavior: Behavior normal.         Assessment:       1. Impacted cerumen of left ear        Plan:       There are no Patient Instructions on file for this visit.  Impacted cerumen of left ear     I have personally reviewed the encounter note and agree with the assessment and plan as put forth by the nurse practitioner.  Dr. Rahul Cheng M.D.

## 2022-10-11 ENCOUNTER — OFFICE VISIT (OUTPATIENT)
Dept: OBSTETRICS AND GYNECOLOGY | Facility: CLINIC | Age: 32
End: 2022-10-11
Payer: MEDICAID

## 2022-10-11 VITALS
BODY MASS INDEX: 35.31 KG/M2 | DIASTOLIC BLOOD PRESSURE: 93 MMHG | SYSTOLIC BLOOD PRESSURE: 142 MMHG | WEIGHT: 253.19 LBS | HEART RATE: 72 BPM

## 2022-10-11 DIAGNOSIS — Z12.72 SPECIAL SCREENING FOR MALIGNANT NEOPLASMS, VAGINA: Primary | ICD-10-CM

## 2022-10-11 DIAGNOSIS — N95.1 MENOPAUSAL SYMPTOM: ICD-10-CM

## 2022-10-11 DIAGNOSIS — R61 NIGHT SWEATS: ICD-10-CM

## 2022-10-11 DIAGNOSIS — Z01.419 ROUTINE GYNECOLOGICAL EXAMINATION: ICD-10-CM

## 2022-10-11 PROCEDURE — 3008F BODY MASS INDEX DOCD: CPT | Mod: CPTII,,, | Performed by: OBSTETRICS & GYNECOLOGY

## 2022-10-11 PROCEDURE — 3008F PR BODY MASS INDEX (BMI) DOCUMENTED: ICD-10-PCS | Mod: CPTII,,, | Performed by: OBSTETRICS & GYNECOLOGY

## 2022-10-11 PROCEDURE — 87624 HUMAN PAPILLOMAVIRUS (HPV): ICD-10-PCS | Mod: ,,, | Performed by: CLINICAL MEDICAL LABORATORY

## 2022-10-11 PROCEDURE — 1159F PR MEDICATION LIST DOCUMENTED IN MEDICAL RECORD: ICD-10-PCS | Mod: CPTII,,, | Performed by: OBSTETRICS & GYNECOLOGY

## 2022-10-11 PROCEDURE — 4010F ACE/ARB THERAPY RXD/TAKEN: CPT | Mod: CPTII,,, | Performed by: OBSTETRICS & GYNECOLOGY

## 2022-10-11 PROCEDURE — 99395 PREV VISIT EST AGE 18-39: CPT | Mod: ,,, | Performed by: OBSTETRICS & GYNECOLOGY

## 2022-10-11 PROCEDURE — 3077F PR MOST RECENT SYSTOLIC BLOOD PRESSURE >= 140 MM HG: ICD-10-PCS | Mod: CPTII,,, | Performed by: OBSTETRICS & GYNECOLOGY

## 2022-10-11 PROCEDURE — 3077F SYST BP >= 140 MM HG: CPT | Mod: CPTII,,, | Performed by: OBSTETRICS & GYNECOLOGY

## 2022-10-11 PROCEDURE — 3080F DIAST BP >= 90 MM HG: CPT | Mod: CPTII,,, | Performed by: OBSTETRICS & GYNECOLOGY

## 2022-10-11 PROCEDURE — 4010F PR ACE/ARB THEARPY RXD/TAKEN: ICD-10-PCS | Mod: CPTII,,, | Performed by: OBSTETRICS & GYNECOLOGY

## 2022-10-11 PROCEDURE — 99395 PR PREVENTIVE VISIT,EST,18-39: ICD-10-PCS | Mod: ,,, | Performed by: OBSTETRICS & GYNECOLOGY

## 2022-10-11 PROCEDURE — 87624 HPV HI-RISK TYP POOLED RSLT: CPT | Mod: ,,, | Performed by: CLINICAL MEDICAL LABORATORY

## 2022-10-11 PROCEDURE — 1159F MED LIST DOCD IN RCRD: CPT | Mod: CPTII,,, | Performed by: OBSTETRICS & GYNECOLOGY

## 2022-10-11 PROCEDURE — 88142 CYTOPATH C/V THIN LAYER: CPT | Mod: GCY | Performed by: OBSTETRICS & GYNECOLOGY

## 2022-10-11 PROCEDURE — 3080F PR MOST RECENT DIASTOLIC BLOOD PRESSURE >= 90 MM HG: ICD-10-PCS | Mod: CPTII,,, | Performed by: OBSTETRICS & GYNECOLOGY

## 2022-10-11 NOTE — PROGRESS NOTES
CC: Well woman exam    Sophy Orozco is a 32 y.o. female  presents for well woman exam.    LMP: Patient's last menstrual period was 2021 (exact date)..    Last mammogram: N/A  Last Colonoscopy: N/A    Pt complains of hot flashes.     Past Medical History:   Diagnosis Date    Anxiety     Depression     Depression 10/11/2021    History of ovarian cyst     Hypertension      Past Surgical History:   Procedure Laterality Date     SECTION       x 1    CHOLECYSTECTOMY      LAPAROSCOPIC SALPINGO-OOPHORECTOMY Bilateral 2021    Procedure: SALPINGO-OOPHORECTOMY, LAPAROSCOPIC;  Surgeon: Bryce Juares MD;  Location: [facility];  Service: OB/GYN;  Laterality: Bilateral;    LAPAROSCOPIC TOTAL HYSTERECTOMY Bilateral 2021    Procedure: HYSTERECTOMY, TOTAL VAGINAL LAPAROSCOPIC;  Surgeon: Bryce Juares MD;  Location: [facility] OR;  Service: OB/GYN;  Laterality: Bilateral;  vaginal - vpath     TUBAL LIGATION  2014     Social History     Socioeconomic History    Marital status:    Tobacco Use    Smoking status: Every Day     Packs/day: 0.25     Types: Cigars, Cigarettes    Smokeless tobacco: Never   Substance and Sexual Activity    Alcohol use: Yes     Comment: Social    Drug use: Yes     Types: Marijuana    Sexual activity: Yes     Partners: Male     Birth control/protection: See Surgical Hx     Family History   Problem Relation Age of Onset    Ovarian cancer Maternal Grandmother     Hypertension Father     Diabetes Father     Heart disease Father     Atrial fibrillation Father     Diabetes Mother     Hypertension Mother     Heart disease Mother     Breast cancer Son     Breast cancer Son      OB History          2    Para   2    Term   2            AB        Living   2         SAB        IAB        Ectopic        Multiple        Live Births   2                 BP (!) 142/93   Pulse 72   Wt 114.9 kg (253 lb 3.2 oz)   LMP 2021 (Exact Date)    BMI 35.31 kg/m²       ROS:  GENERAL: Denies weight gain or weight loss. Feeling well overall.   SKIN: Denies rash or lesions.   HEAD: Denies head injury or headache.   NODES: Denies enlarged lymph nodes.   CHEST: Denies chest pain or shortness of breath.   CARDIOVASCULAR: Denies palpitations or left sided chest pain.   ABDOMEN: No abdominal pain, constipation, diarrhea, nausea, vomiting or rectal bleeding.   URINARY: No frequency, dysuria, hematuria, or burning on urination.  REPRODUCTIVE: See HPI.   BREASTS: The patient performs breast self-examination and denies pain, lumps, or nipple discharge.   HEMATOLOGIC: No easy bruisability or excessive bleeding.   MUSCULOSKELETAL: Denies joint pain or swelling.   NEUROLOGIC: Denies syncope or weakness.   PSYCHIATRIC: Denies depression, anxiety or mood swings.    PHYSICAL EXAM:  APPEARANCE: Well nourished, well developed, in no acute distress.  AFFECT: WNL, alert and oriented x 3  SKIN: No acne or hirsutism  NECK: Neck symmetric without masses or thyromegaly  NODES: No inguinal, cervical, axillary, or femoral lymph node enlargement  CHEST: Good respiratory effect  ABDOMEN: Soft.  No tenderness or masses.  No hepatosplenomegaly.  No hernias.  BREASTS: Symmetrical, no skin changes or visible lesions.  No palpable masses, nipple discharge bilaterally.  PELVIC: Normal external genitalia without lesions.  Normal hair distribution.  Adequate perineal body, normal urethral meatus.  Vagina moist and well rugated without lesions or discharge.  Cervix  and uterus surgically absent. Adnexa without masses or tenderness.    EXTREMITIES: No edema.    Special screening for malignant neoplasms, vagina  -     ThinPrep Pap Test; Future; Expected date: 10/11/2022    Routine gynecological examination  -     ThinPrep Pap Test; Future; Expected date: 10/11/2022    Night sweats  -     estrogens, conjugated, (PREMARIN) 0.625 MG tablet; Take 1 tablet (0.625 mg total) by mouth once daily.  Dispense:  30 tablet; Refill: 11    Menopausal symptom  -     estrogens, conjugated, (PREMARIN) 0.625 MG tablet; Take 1 tablet (0.625 mg total) by mouth once daily.  Dispense: 30 tablet; Refill: 11          Patient was counseled today on A.C.S. Pap guidelines and recommendations for yearly pelvic exams, mammograms and monthly self breast exams; to see her PCP for other health maintenance.   Exercise regimen encouraged  Healthy food choices encouraged  Questions answered to desired level of satisfaction  Verbalized understanding to all information and instructions    Follow up in about 3 months (around 1/11/2023) for FU- 3 months, 1 year annual.      Bryce Juares M.D., FCOG    OB/GYN

## 2022-10-13 LAB
GH SERPL-MCNC: NORMAL NG/ML
HPV 16: NEGATIVE
HPV 18: NEGATIVE
HPV OTHER: NEGATIVE
INSULIN SERPL-ACNC: NORMAL U[IU]/ML
LAB AP CLINICAL INFORMATION: NORMAL
LAB AP GYN INTERPRETATION: NEGATIVE
LAB AP PAP DISCLAIMER COMMENTS: NORMAL
RENIN PLAS-CCNC: NORMAL NG/ML/H

## 2022-10-26 ENCOUNTER — OFFICE VISIT (OUTPATIENT)
Dept: FAMILY MEDICINE | Facility: CLINIC | Age: 32
End: 2022-10-26
Payer: MEDICAID

## 2022-10-26 VITALS
WEIGHT: 250 LBS | HEART RATE: 87 BPM | BODY MASS INDEX: 35 KG/M2 | SYSTOLIC BLOOD PRESSURE: 149 MMHG | HEIGHT: 71 IN | OXYGEN SATURATION: 98 % | DIASTOLIC BLOOD PRESSURE: 100 MMHG

## 2022-10-26 DIAGNOSIS — F41.9 ANXIETY: ICD-10-CM

## 2022-10-26 DIAGNOSIS — I10 ESSENTIAL HYPERTENSION: Primary | ICD-10-CM

## 2022-10-26 DIAGNOSIS — Z13.1 SCREENING FOR DIABETES MELLITUS: ICD-10-CM

## 2022-10-26 DIAGNOSIS — Z13.220 SCREENING FOR HYPERLIPIDEMIA: ICD-10-CM

## 2022-10-26 DIAGNOSIS — K21.9 GASTROESOPHAGEAL REFLUX DISEASE, UNSPECIFIED WHETHER ESOPHAGITIS PRESENT: ICD-10-CM

## 2022-10-26 DIAGNOSIS — Z13.29 SCREENING FOR HYPOTHYROIDISM: ICD-10-CM

## 2022-10-26 DIAGNOSIS — F32.A DEPRESSION, UNSPECIFIED DEPRESSION TYPE: ICD-10-CM

## 2022-10-26 LAB
ALBUMIN SERPL BCP-MCNC: 3.6 G/DL (ref 3.5–5)
ALBUMIN/GLOB SERPL: 1 {RATIO}
ALP SERPL-CCNC: 99 U/L (ref 37–98)
ALT SERPL W P-5'-P-CCNC: 22 U/L (ref 13–56)
ANION GAP SERPL CALCULATED.3IONS-SCNC: 12 MMOL/L (ref 7–16)
AST SERPL W P-5'-P-CCNC: 12 U/L (ref 15–37)
BASOPHILS # BLD AUTO: 0.05 K/UL (ref 0–0.2)
BASOPHILS NFR BLD AUTO: 0.7 % (ref 0–1)
BILIRUB SERPL-MCNC: 0.4 MG/DL (ref ?–1.2)
BUN SERPL-MCNC: 12 MG/DL (ref 7–18)
BUN/CREAT SERPL: 13 (ref 6–20)
CALCIUM SERPL-MCNC: 9 MG/DL (ref 8.5–10.1)
CHLORIDE SERPL-SCNC: 107 MMOL/L (ref 98–107)
CHOLEST SERPL-MCNC: 189 MG/DL (ref 0–200)
CHOLEST/HDLC SERPL: 5.4 {RATIO}
CO2 SERPL-SCNC: 26 MMOL/L (ref 21–32)
CREAT SERPL-MCNC: 0.92 MG/DL (ref 0.55–1.02)
DIFFERENTIAL METHOD BLD: ABNORMAL
EGFR (NO RACE VARIABLE) (RUSH/TITUS): 84 ML/MIN/1.73M²
EOSINOPHIL # BLD AUTO: 0.15 K/UL (ref 0–0.5)
EOSINOPHIL NFR BLD AUTO: 2 % (ref 1–4)
ERYTHROCYTE [DISTWIDTH] IN BLOOD BY AUTOMATED COUNT: 13.9 % (ref 11.5–14.5)
EST. AVERAGE GLUCOSE BLD GHB EST-MCNC: 117 MG/DL
GLOBULIN SER-MCNC: 3.6 G/DL (ref 2–4)
GLUCOSE SERPL-MCNC: 110 MG/DL (ref 74–106)
HBA1C MFR BLD HPLC: 6.1 % (ref 4.5–6.6)
HCT VFR BLD AUTO: 44.6 % (ref 38–47)
HDLC SERPL-MCNC: 35 MG/DL (ref 40–60)
HGB BLD-MCNC: 14.4 G/DL (ref 12–16)
IMM GRANULOCYTES # BLD AUTO: 0.02 K/UL (ref 0–0.04)
IMM GRANULOCYTES NFR BLD: 0.3 % (ref 0–0.4)
LDLC SERPL CALC-MCNC: 128 MG/DL
LDLC/HDLC SERPL: 3.7 {RATIO}
LYMPHOCYTES # BLD AUTO: 2.28 K/UL (ref 1–4.8)
LYMPHOCYTES NFR BLD AUTO: 29.7 % (ref 27–41)
MCH RBC QN AUTO: 26.8 PG (ref 27–31)
MCHC RBC AUTO-ENTMCNC: 32.3 G/DL (ref 32–36)
MCV RBC AUTO: 83.1 FL (ref 80–96)
MONOCYTES # BLD AUTO: 0.48 K/UL (ref 0–0.8)
MONOCYTES NFR BLD AUTO: 6.3 % (ref 2–6)
MPC BLD CALC-MCNC: 11 FL (ref 9.4–12.4)
NEUTROPHILS # BLD AUTO: 4.69 K/UL (ref 1.8–7.7)
NEUTROPHILS NFR BLD AUTO: 61 % (ref 53–65)
NONHDLC SERPL-MCNC: 154 MG/DL
NRBC # BLD AUTO: 0 X10E3/UL
NRBC, AUTO (.00): 0 %
PLATELET # BLD AUTO: 316 K/UL (ref 150–400)
POTASSIUM SERPL-SCNC: 4 MMOL/L (ref 3.5–5.1)
PROT SERPL-MCNC: 7.2 G/DL (ref 6.4–8.2)
RBC # BLD AUTO: 5.37 M/UL (ref 4.2–5.4)
SODIUM SERPL-SCNC: 141 MMOL/L (ref 136–145)
TRIGL SERPL-MCNC: 130 MG/DL (ref 35–150)
TSH SERPL DL<=0.005 MIU/L-ACNC: 2.18 UIU/ML (ref 0.36–3.74)
VLDLC SERPL-MCNC: 26 MG/DL
WBC # BLD AUTO: 7.67 K/UL (ref 4.5–11)

## 2022-10-26 PROCEDURE — 84443 TSH: ICD-10-PCS | Mod: ,,, | Performed by: CLINICAL MEDICAL LABORATORY

## 2022-10-26 PROCEDURE — 1159F MED LIST DOCD IN RCRD: CPT | Mod: CPTII,,, | Performed by: NURSE PRACTITIONER

## 2022-10-26 PROCEDURE — 1160F PR REVIEW ALL MEDS BY PRESCRIBER/CLIN PHARMACIST DOCUMENTED: ICD-10-PCS | Mod: CPTII,,, | Performed by: NURSE PRACTITIONER

## 2022-10-26 PROCEDURE — 85025 CBC WITH DIFFERENTIAL: ICD-10-PCS | Mod: ,,, | Performed by: CLINICAL MEDICAL LABORATORY

## 2022-10-26 PROCEDURE — 83036 HEMOGLOBIN GLYCOSYLATED A1C: CPT | Mod: ,,, | Performed by: CLINICAL MEDICAL LABORATORY

## 2022-10-26 PROCEDURE — 4010F ACE/ARB THERAPY RXD/TAKEN: CPT | Mod: CPTII,,, | Performed by: NURSE PRACTITIONER

## 2022-10-26 PROCEDURE — 85025 COMPLETE CBC W/AUTO DIFF WBC: CPT | Mod: ,,, | Performed by: CLINICAL MEDICAL LABORATORY

## 2022-10-26 PROCEDURE — 80061 LIPID PANEL: ICD-10-PCS | Mod: ,,, | Performed by: CLINICAL MEDICAL LABORATORY

## 2022-10-26 PROCEDURE — 4010F PR ACE/ARB THEARPY RXD/TAKEN: ICD-10-PCS | Mod: CPTII,,, | Performed by: NURSE PRACTITIONER

## 2022-10-26 PROCEDURE — 80053 COMPREHENSIVE METABOLIC PANEL: ICD-10-PCS | Mod: ,,, | Performed by: CLINICAL MEDICAL LABORATORY

## 2022-10-26 PROCEDURE — 1159F PR MEDICATION LIST DOCUMENTED IN MEDICAL RECORD: ICD-10-PCS | Mod: CPTII,,, | Performed by: NURSE PRACTITIONER

## 2022-10-26 PROCEDURE — 99214 OFFICE O/P EST MOD 30 MIN: CPT | Mod: ,,, | Performed by: NURSE PRACTITIONER

## 2022-10-26 PROCEDURE — 3080F PR MOST RECENT DIASTOLIC BLOOD PRESSURE >= 90 MM HG: ICD-10-PCS | Mod: CPTII,,, | Performed by: NURSE PRACTITIONER

## 2022-10-26 PROCEDURE — 1160F RVW MEDS BY RX/DR IN RCRD: CPT | Mod: CPTII,,, | Performed by: NURSE PRACTITIONER

## 2022-10-26 PROCEDURE — 3077F SYST BP >= 140 MM HG: CPT | Mod: CPTII,,, | Performed by: NURSE PRACTITIONER

## 2022-10-26 PROCEDURE — 84443 ASSAY THYROID STIM HORMONE: CPT | Mod: ,,, | Performed by: CLINICAL MEDICAL LABORATORY

## 2022-10-26 PROCEDURE — 99214 PR OFFICE/OUTPT VISIT, EST, LEVL IV, 30-39 MIN: ICD-10-PCS | Mod: ,,, | Performed by: NURSE PRACTITIONER

## 2022-10-26 PROCEDURE — 80061 LIPID PANEL: CPT | Mod: ,,, | Performed by: CLINICAL MEDICAL LABORATORY

## 2022-10-26 PROCEDURE — 80053 COMPREHEN METABOLIC PANEL: CPT | Mod: ,,, | Performed by: CLINICAL MEDICAL LABORATORY

## 2022-10-26 PROCEDURE — 3077F PR MOST RECENT SYSTOLIC BLOOD PRESSURE >= 140 MM HG: ICD-10-PCS | Mod: CPTII,,, | Performed by: NURSE PRACTITIONER

## 2022-10-26 PROCEDURE — 83036 HEMOGLOBIN A1C: ICD-10-PCS | Mod: ,,, | Performed by: CLINICAL MEDICAL LABORATORY

## 2022-10-26 PROCEDURE — 3080F DIAST BP >= 90 MM HG: CPT | Mod: CPTII,,, | Performed by: NURSE PRACTITIONER

## 2022-10-26 RX ORDER — ESCITALOPRAM OXALATE 10 MG/1
10 TABLET ORAL DAILY
Qty: 90 TABLET | Refills: 1 | Status: SHIPPED | OUTPATIENT
Start: 2022-10-26 | End: 2023-02-09

## 2022-10-26 RX ORDER — LISINOPRIL 20 MG/1
20 TABLET ORAL DAILY
Qty: 90 TABLET | Refills: 1 | Status: SHIPPED | OUTPATIENT
Start: 2022-10-26 | End: 2023-02-09 | Stop reason: SDUPTHER

## 2022-10-26 RX ORDER — HYDROCHLOROTHIAZIDE 12.5 MG/1
12.5 TABLET ORAL DAILY
Qty: 90 TABLET | Refills: 0 | Status: SHIPPED | OUTPATIENT
Start: 2022-10-26 | End: 2023-02-09 | Stop reason: SDUPTHER

## 2022-10-26 RX ORDER — OMEPRAZOLE 20 MG/1
20 CAPSULE, DELAYED RELEASE ORAL DAILY
Qty: 90 CAPSULE | Refills: 1 | Status: SHIPPED | OUTPATIENT
Start: 2022-10-26 | End: 2023-02-09 | Stop reason: SDUPTHER

## 2022-10-26 NOTE — PATIENT INSTRUCTIONS
Lab obtained in clinic today, we will notify you of results and any necessary changes to plan of care   Refills on routine medications   Continue current medications and   Start HCTZ 12.5 mg take one tablet daily   Decrease salt and caffeine intake   Check blood pressure in the morning and again in the afternoon, keep written log and bring in to follow up visit   Follow up in two weeks

## 2022-10-26 NOTE — PROGRESS NOTES
"Clinic note     Patient name: Sophy Orozco is a 32 y.o. female   Chief compliant   Chief Complaint   Patient presents with    Medication Refill     No problems at this time, states she just need refills on meds.       Subjective     History of present illness   In clinic for routine follow up and medication refills   She recently had follow up appointment with Dr Pedersen, her blood pressure was elevated during visit and she was instructed to follow up with PCP  She is a daily smoker  Hx of HTN, anxiety and depression with symptoms well controlled on current medication  Currently on lisinopril 20 mg daily, discussed adding HCTZ 12.5 mg daily, dosing and possible side effects discussed, she agrees with plan of care   Encouraged to monitor blood pressure at home and keep written log  Discussed decreasing intake of sodium and caffeine, understanding verbalized          Social History     Tobacco Use    Smoking status: Every Day     Packs/day: 0.25     Types: Cigars, Cigarettes    Smokeless tobacco: Never    Tobacco comments:     Trying to "cut back"    Substance Use Topics    Alcohol use: Yes     Comment: Social    Drug use: Yes     Types: Marijuana       Review of patient's allergies indicates:   Allergen Reactions    Clindamycin Hives     swelling       Past Medical History:   Diagnosis Date    Anxiety     Depression     Depression 10/11/2021    History of ovarian cyst     Hypertension        Past Surgical History:   Procedure Laterality Date     SECTION       x 1    CHOLECYSTECTOMY      LAPAROSCOPIC SALPINGO-OOPHORECTOMY Bilateral 2021    Procedure: SALPINGO-OOPHORECTOMY, LAPAROSCOPIC;  Surgeon: Bryce Juares MD;  Location: [facility];  Service: OB/GYN;  Laterality: Bilateral;    LAPAROSCOPIC TOTAL HYSTERECTOMY Bilateral 2021    Procedure: HYSTERECTOMY, TOTAL VAGINAL LAPAROSCOPIC;  Surgeon: Bryce Juares MD;  Location: [facility] OR;  Service: OB/GYN;  Laterality: " "Bilateral;  vaginal - vpath     TUBAL LIGATION  12/2014        Family History   Problem Relation Age of Onset    Ovarian cancer Maternal Grandmother     Hypertension Father     Diabetes Father     Heart disease Father     Atrial fibrillation Father     Diabetes Mother     Hypertension Mother     Heart disease Mother     Breast cancer Son     Breast cancer Son          Current Outpatient Medications:     EScitalopram oxalate (LEXAPRO) 10 MG tablet, Take 1 tablet (10 mg total) by mouth once daily., Disp: 90 tablet, Rfl: 1    estrogens, conjugated, (PREMARIN) 0.625 MG tablet, Take 1 tablet (0.625 mg total) by mouth once daily., Disp: 30 tablet, Rfl: 11    hydroCHLOROthiazide (HYDRODIURIL) 12.5 MG Tab, Take 1 tablet (12.5 mg total) by mouth once daily., Disp: 90 tablet, Rfl: 0    lisinopriL (PRINIVIL,ZESTRIL) 20 MG tablet, Take 1 tablet (20 mg total) by mouth once daily., Disp: 90 tablet, Rfl: 1    omeprazole (PRILOSEC) 20 MG capsule, Take 1 capsule (20 mg total) by mouth once daily., Disp: 90 capsule, Rfl: 1    Review of Systems   Constitutional:  Negative for appetite change, chills, fatigue and fever.   Eyes:  Negative for visual disturbance.   Respiratory:  Negative for cough and shortness of breath.    Cardiovascular:  Negative for chest pain, palpitations and leg swelling.   Gastrointestinal:  Negative for abdominal pain, change in bowel habit, constipation, diarrhea, nausea, vomiting and change in bowel habit.   Endocrine: Negative for polydipsia and polyuria.   Genitourinary:  Negative for dysuria.   Musculoskeletal:  Negative for arthralgias, gait problem and myalgias.   Integumentary:  Negative for wound.   Neurological:  Positive for headaches. Negative for dizziness, syncope and light-headedness.   Psychiatric/Behavioral:  Negative for confusion, dysphoric mood and sleep disturbance. The patient is not nervous/anxious.      Objective     BP (!) 149/100   Pulse 87   Ht 5' 11" (1.803 m)   Wt 113.4 kg (250 " lb)   LMP 08/25/2021 (Exact Date)   SpO2 98%   BMI 34.87 kg/m²     Physical Exam   Constitutional: She is oriented to person, place, and time. No distress.   HENT:   Head: Atraumatic.   Mouth/Throat: Mucous membranes are moist.   Eyes: Pupils are equal, round, and reactive to light. Conjunctivae are normal.   Cardiovascular: Normal rate and regular rhythm. Pulmonary:      Effort: Pulmonary effort is normal. No respiratory distress.      Breath sounds: Normal breath sounds. No wheezing, rhonchi or rales.     Abdominal: Soft. Bowel sounds are normal. She exhibits no distension. There is no abdominal tenderness.   Musculoskeletal:         General: Normal range of motion.      Cervical back: Neck supple.      Right lower leg: No edema.      Left lower leg: No edema.   Neurological: She is alert and oriented to person, place, and time. Gait normal.   Skin: Skin is warm and dry.   Psychiatric: Her behavior is normal. Mood normal.     Lab Results   Component Value Date    WBC 8.81 10/11/2021    HGB 12.5 10/11/2021    HCT 40.4 10/11/2021    MCV 78.3 (L) 10/11/2021     10/11/2021       CMP  Sodium   Date Value Ref Range Status   10/11/2021 141 136 - 145 mmol/L Final     Potassium   Date Value Ref Range Status   10/11/2021 4.0 3.5 - 5.1 mmol/L Final     Chloride   Date Value Ref Range Status   10/11/2021 109 (H) 98 - 107 mmol/L Final     CO2   Date Value Ref Range Status   10/11/2021 27 21 - 32 mmol/L Final     Glucose   Date Value Ref Range Status   10/11/2021 94 74 - 106 mg/dL Final     BUN   Date Value Ref Range Status   10/11/2021 9 7 - 18 mg/dL Final     Creatinine   Date Value Ref Range Status   10/11/2021 0.96 0.55 - 1.02 mg/dL Final     Calcium   Date Value Ref Range Status   10/11/2021 9.0 8.5 - 10.1 mg/dL Final     Total Protein   Date Value Ref Range Status   10/11/2021 7.5 6.4 - 8.2 g/dL Final     Albumin   Date Value Ref Range Status   10/11/2021 3.4 (L) 3.5 - 5.0 g/dL Final     Bilirubin, Total   Date  Value Ref Range Status   10/11/2021 0.5 >0.0 - 1.2 mg/dL Final     Alk Phos   Date Value Ref Range Status   10/11/2021 84 37 - 98 U/L Final     AST   Date Value Ref Range Status   10/11/2021 18 15 - 37 U/L Final     ALT   Date Value Ref Range Status   10/11/2021 25 13 - 56 U/L Final     Anion Gap   Date Value Ref Range Status   10/11/2021 9 7 - 16 mmol/L Final     eGFR   Date Value Ref Range Status   10/11/2021 71 >=60 mL/min/1.73m² Final     Lab Results   Component Value Date    TSH 1.520 10/11/2021     Lab Results   Component Value Date    CHOL 158 10/11/2021     Lab Results   Component Value Date    HDL 31 (L) 10/11/2021     Lab Results   Component Value Date    LDLCALC 106 10/11/2021     Lab Results   Component Value Date    TRIG 104 10/11/2021     Lab Results   Component Value Date    CHOLHDL 5.1 10/11/2021     Lab Results   Component Value Date    HGBA1C 5.5 10/11/2021         Assessment and Plan   Essential hypertension  -     lisinopriL (PRINIVIL,ZESTRIL) 20 MG tablet; Take 1 tablet (20 mg total) by mouth once daily.  Dispense: 90 tablet; Refill: 1  -     CBC Auto Differential; Future; Expected date: 10/26/2022  -     Comprehensive Metabolic Panel; Future; Expected date: 10/26/2022  -     hydroCHLOROthiazide (HYDRODIURIL) 12.5 MG Tab; Take 1 tablet (12.5 mg total) by mouth once daily.  Dispense: 90 tablet; Refill: 0    Anxiety  -     EScitalopram oxalate (LEXAPRO) 10 MG tablet; Take 1 tablet (10 mg total) by mouth once daily.  Dispense: 90 tablet; Refill: 1    Depression, unspecified depression type  -     EScitalopram oxalate (LEXAPRO) 10 MG tablet; Take 1 tablet (10 mg total) by mouth once daily.  Dispense: 90 tablet; Refill: 1    Gastroesophageal reflux disease, unspecified whether esophagitis present  -     omeprazole (PRILOSEC) 20 MG capsule; Take 1 capsule (20 mg total) by mouth once daily.  Dispense: 90 capsule; Refill: 1    BMI 34.0-34.9,adult    Screening for hypothyroidism  -     TSH; Future;  Expected date: 10/26/2022    Screening for diabetes mellitus  -     Hemoglobin A1C; Future; Expected date: 10/26/2022    Screening for hyperlipidemia  -     Lipid Panel; Future; Expected date: 10/26/2022        Patient Instructions  Patient Instructions   Lab obtained in clinic today, we will notify you of results and any necessary changes to plan of care   Refills on routine medications   Continue current medications and   Start HCTZ 12.5 mg take one tablet daily   Decrease salt and caffeine intake   Check blood pressure in the morning and again in the afternoon, keep written log and bring in to follow up visit   Follow up in two weeks

## 2022-11-09 ENCOUNTER — OFFICE VISIT (OUTPATIENT)
Dept: FAMILY MEDICINE | Facility: CLINIC | Age: 32
End: 2022-11-09
Payer: MEDICAID

## 2022-11-09 VITALS
HEIGHT: 71 IN | DIASTOLIC BLOOD PRESSURE: 76 MMHG | SYSTOLIC BLOOD PRESSURE: 118 MMHG | BODY MASS INDEX: 32.9 KG/M2 | HEART RATE: 88 BPM | WEIGHT: 235 LBS | TEMPERATURE: 98 F | OXYGEN SATURATION: 98 %

## 2022-11-09 DIAGNOSIS — F17.200 CURRENT EVERY DAY SMOKER: ICD-10-CM

## 2022-11-09 DIAGNOSIS — F41.9 ANXIETY: ICD-10-CM

## 2022-11-09 DIAGNOSIS — R73.09 ELEVATED HEMOGLOBIN A1C: ICD-10-CM

## 2022-11-09 DIAGNOSIS — I10 ESSENTIAL HYPERTENSION: Primary | ICD-10-CM

## 2022-11-09 DIAGNOSIS — F32.A DEPRESSION, UNSPECIFIED DEPRESSION TYPE: ICD-10-CM

## 2022-11-09 PROCEDURE — 3008F PR BODY MASS INDEX (BMI) DOCUMENTED: ICD-10-PCS | Mod: CPTII,,, | Performed by: NURSE PRACTITIONER

## 2022-11-09 PROCEDURE — 3044F PR MOST RECENT HEMOGLOBIN A1C LEVEL <7.0%: ICD-10-PCS | Mod: CPTII,,, | Performed by: NURSE PRACTITIONER

## 2022-11-09 PROCEDURE — 99212 PR OFFICE/OUTPT VISIT, EST, LEVL II, 10-19 MIN: ICD-10-PCS | Mod: ,,, | Performed by: NURSE PRACTITIONER

## 2022-11-09 PROCEDURE — 4010F ACE/ARB THERAPY RXD/TAKEN: CPT | Mod: CPTII,,, | Performed by: NURSE PRACTITIONER

## 2022-11-09 PROCEDURE — 3078F DIAST BP <80 MM HG: CPT | Mod: CPTII,,, | Performed by: NURSE PRACTITIONER

## 2022-11-09 PROCEDURE — 3074F PR MOST RECENT SYSTOLIC BLOOD PRESSURE < 130 MM HG: ICD-10-PCS | Mod: CPTII,,, | Performed by: NURSE PRACTITIONER

## 2022-11-09 PROCEDURE — 1159F MED LIST DOCD IN RCRD: CPT | Mod: CPTII,,, | Performed by: NURSE PRACTITIONER

## 2022-11-09 PROCEDURE — 1159F PR MEDICATION LIST DOCUMENTED IN MEDICAL RECORD: ICD-10-PCS | Mod: CPTII,,, | Performed by: NURSE PRACTITIONER

## 2022-11-09 PROCEDURE — 1160F PR REVIEW ALL MEDS BY PRESCRIBER/CLIN PHARMACIST DOCUMENTED: ICD-10-PCS | Mod: CPTII,,, | Performed by: NURSE PRACTITIONER

## 2022-11-09 PROCEDURE — 3074F SYST BP LT 130 MM HG: CPT | Mod: CPTII,,, | Performed by: NURSE PRACTITIONER

## 2022-11-09 PROCEDURE — 3044F HG A1C LEVEL LT 7.0%: CPT | Mod: CPTII,,, | Performed by: NURSE PRACTITIONER

## 2022-11-09 PROCEDURE — 3008F BODY MASS INDEX DOCD: CPT | Mod: CPTII,,, | Performed by: NURSE PRACTITIONER

## 2022-11-09 PROCEDURE — 3078F PR MOST RECENT DIASTOLIC BLOOD PRESSURE < 80 MM HG: ICD-10-PCS | Mod: CPTII,,, | Performed by: NURSE PRACTITIONER

## 2022-11-09 PROCEDURE — 4010F PR ACE/ARB THEARPY RXD/TAKEN: ICD-10-PCS | Mod: CPTII,,, | Performed by: NURSE PRACTITIONER

## 2022-11-09 PROCEDURE — 1160F RVW MEDS BY RX/DR IN RCRD: CPT | Mod: CPTII,,, | Performed by: NURSE PRACTITIONER

## 2022-11-09 PROCEDURE — 99212 OFFICE O/P EST SF 10 MIN: CPT | Mod: ,,, | Performed by: NURSE PRACTITIONER

## 2022-11-09 NOTE — PROGRESS NOTES
"Clinic note     Patient name: Sophy Orozco is a 32 y.o. female   Chief compliant   Chief Complaint   Patient presents with    Hypertension     Follow up on b/p. No problems today.        Subjective     History of present illness   In clinic for routine follow up after medication changes were made last office visit, HCTZ 12.5 mg daily was added, tolerating medication without problems   Hx of HTN, anxiety and depression with symptoms well controlled on current medication  Screening A1c was 6.1, discussed plate method diet, exercise and weight loss plan, all questions addressed, printed information provided; will recheck a1c in three months   Encouraged to monitor blood pressure at home and keep written log  She is a daily smoker       Social History     Tobacco Use    Smoking status: Every Day     Packs/day: 0.25     Types: Cigars, Cigarettes    Smokeless tobacco: Never    Tobacco comments:     Trying to "cut back"    Substance Use Topics    Alcohol use: Yes     Comment: Social    Drug use: Yes     Types: Marijuana       Review of patient's allergies indicates:   Allergen Reactions    Clindamycin Hives     swelling       Past Medical History:   Diagnosis Date    Anxiety     Depression     Depression 10/11/2021    History of ovarian cyst     Hypertension        Past Surgical History:   Procedure Laterality Date     SECTION       x 1    CHOLECYSTECTOMY      LAPAROSCOPIC SALPINGO-OOPHORECTOMY Bilateral 2021    Procedure: SALPINGO-OOPHORECTOMY, LAPAROSCOPIC;  Surgeon: Bryce Juares MD;  Location: [facility];  Service: OB/GYN;  Laterality: Bilateral;    LAPAROSCOPIC TOTAL HYSTERECTOMY Bilateral 2021    Procedure: HYSTERECTOMY, TOTAL VAGINAL LAPAROSCOPIC;  Surgeon: Bryce Juares MD;  Location: [facility] OR;  Service: OB/GYN;  Laterality: Bilateral;  vaginal - vpath     TUBAL LIGATION  2014        Family History   Problem Relation Age of Onset    Ovarian cancer Maternal " "Grandmother     Hypertension Father     Diabetes Father     Heart disease Father     Atrial fibrillation Father     Diabetes Mother     Hypertension Mother     Heart disease Mother     Breast cancer Son     Breast cancer Son          Current Outpatient Medications:     EScitalopram oxalate (LEXAPRO) 10 MG tablet, Take 1 tablet (10 mg total) by mouth once daily., Disp: 90 tablet, Rfl: 1    estrogens, conjugated, (PREMARIN) 0.625 MG tablet, Take 1 tablet (0.625 mg total) by mouth once daily., Disp: 30 tablet, Rfl: 11    hydroCHLOROthiazide (HYDRODIURIL) 12.5 MG Tab, Take 1 tablet (12.5 mg total) by mouth once daily., Disp: 90 tablet, Rfl: 0    lisinopriL (PRINIVIL,ZESTRIL) 20 MG tablet, Take 1 tablet (20 mg total) by mouth once daily., Disp: 90 tablet, Rfl: 1    omeprazole (PRILOSEC) 20 MG capsule, Take 1 capsule (20 mg total) by mouth once daily., Disp: 90 capsule, Rfl: 1    Review of Systems   Constitutional:  Negative for appetite change, chills, fatigue, fever and unexpected weight change.   Eyes:  Negative for visual disturbance.   Respiratory:  Negative for cough and shortness of breath.    Cardiovascular:  Negative for chest pain, palpitations and leg swelling.   Gastrointestinal:  Negative for abdominal pain, change in bowel habit, constipation, diarrhea, nausea, vomiting and change in bowel habit.   Endocrine: Negative for polydipsia and polyuria.   Genitourinary:  Negative for dysuria and frequency.   Musculoskeletal:  Negative for arthralgias, gait problem and myalgias.   Neurological:  Negative for dizziness, syncope, light-headedness and headaches.   Psychiatric/Behavioral:  Negative for confusion, dysphoric mood and sleep disturbance. The patient is not nervous/anxious.      Objective     /76   Pulse 88   Temp 97.9 °F (36.6 °C)   Ht 5' 11" (1.803 m)   Wt 106.6 kg (235 lb)   LMP 08/25/2021 (Exact Date)   SpO2 98%   BMI 32.78 kg/m²     Physical Exam   Constitutional: She is oriented to person, " place, and time. No distress.   HENT:   Head: Atraumatic.   Mouth/Throat: Mucous membranes are moist.   Eyes: Pupils are equal, round, and reactive to light. Conjunctivae are normal.   Cardiovascular: Normal rate and regular rhythm. Pulmonary:      Effort: Pulmonary effort is normal. No respiratory distress.      Breath sounds: Normal breath sounds. No wheezing, rhonchi or rales.     Abdominal: Soft. Bowel sounds are normal. She exhibits no distension. There is no abdominal tenderness.   Musculoskeletal:         General: Normal range of motion.      Cervical back: Neck supple.   Neurological: She is alert and oriented to person, place, and time. Gait normal.   Skin: Skin is warm and dry.   Psychiatric: Her behavior is normal. Mood normal.     Lab Results   Component Value Date    WBC 7.67 10/26/2022    HGB 14.4 10/26/2022    HCT 44.6 10/26/2022    MCV 83.1 10/26/2022     10/26/2022       CMP  Sodium   Date Value Ref Range Status   10/26/2022 141 136 - 145 mmol/L Final     Potassium   Date Value Ref Range Status   10/26/2022 4.0 3.5 - 5.1 mmol/L Final     Chloride   Date Value Ref Range Status   10/26/2022 107 98 - 107 mmol/L Final     CO2   Date Value Ref Range Status   10/26/2022 26 21 - 32 mmol/L Final     Glucose   Date Value Ref Range Status   10/26/2022 110 (H) 74 - 106 mg/dL Final     BUN   Date Value Ref Range Status   10/26/2022 12 7 - 18 mg/dL Final     Creatinine   Date Value Ref Range Status   10/26/2022 0.92 0.55 - 1.02 mg/dL Final     Calcium   Date Value Ref Range Status   10/26/2022 9.0 8.5 - 10.1 mg/dL Final     Total Protein   Date Value Ref Range Status   10/26/2022 7.2 6.4 - 8.2 g/dL Final     Albumin   Date Value Ref Range Status   10/26/2022 3.6 3.5 - 5.0 g/dL Final     Bilirubin, Total   Date Value Ref Range Status   10/26/2022 0.4 >0.0 - 1.2 mg/dL Final     Alk Phos   Date Value Ref Range Status   10/26/2022 99 (H) 37 - 98 U/L Final     AST   Date Value Ref Range Status   10/26/2022 12  (L) 15 - 37 U/L Final     ALT   Date Value Ref Range Status   10/26/2022 22 13 - 56 U/L Final     Anion Gap   Date Value Ref Range Status   10/26/2022 12 7 - 16 mmol/L Final     eGFR   Date Value Ref Range Status   10/11/2021 71 >=60 mL/min/1.73m² Final     Lab Results   Component Value Date    TSH 2.180 10/26/2022     Lab Results   Component Value Date    CHOL 189 10/26/2022    CHOL 158 10/11/2021     Lab Results   Component Value Date    HDL 35 (L) 10/26/2022    HDL 31 (L) 10/11/2021     Lab Results   Component Value Date    LDLCALC 128 10/26/2022    LDLCALC 106 10/11/2021     Lab Results   Component Value Date    TRIG 130 10/26/2022    TRIG 104 10/11/2021     Lab Results   Component Value Date    CHOLHDL 5.4 10/26/2022    CHOLHDL 5.1 10/11/2021     Lab Results   Component Value Date    HGBA1C 6.1 10/26/2022         Assessment and Plan   Essential hypertension    Elevated hemoglobin A1c    Depression, unspecified depression type    Anxiety    BMI 32.0-32.9,adult    Current every day smoker        Patient Instructions  Patient Instructions   Plate method discussed  Weight loss and exercise plan discussed   Follow up in three months, will repeat A1c at that time

## 2022-11-09 NOTE — PATIENT INSTRUCTIONS
Plate method discussed  Weight loss and exercise plan discussed   Follow up in three months, will repeat A1c at that time

## 2023-01-10 ENCOUNTER — OFFICE VISIT (OUTPATIENT)
Dept: OBSTETRICS AND GYNECOLOGY | Facility: CLINIC | Age: 33
End: 2023-01-10
Payer: COMMERCIAL

## 2023-01-10 VITALS
BODY MASS INDEX: 35.51 KG/M2 | DIASTOLIC BLOOD PRESSURE: 96 MMHG | WEIGHT: 254.63 LBS | SYSTOLIC BLOOD PRESSURE: 120 MMHG | HEART RATE: 80 BPM

## 2023-01-10 DIAGNOSIS — F32.A DEPRESSION, UNSPECIFIED DEPRESSION TYPE: ICD-10-CM

## 2023-01-10 DIAGNOSIS — N95.1 MENOPAUSAL SYMPTOM: Primary | ICD-10-CM

## 2023-01-10 DIAGNOSIS — F41.9 ANXIETY: ICD-10-CM

## 2023-01-10 PROCEDURE — 3008F PR BODY MASS INDEX (BMI) DOCUMENTED: ICD-10-PCS | Mod: CPTII,,, | Performed by: OBSTETRICS & GYNECOLOGY

## 2023-01-10 PROCEDURE — 4010F ACE/ARB THERAPY RXD/TAKEN: CPT | Mod: CPTII,,, | Performed by: OBSTETRICS & GYNECOLOGY

## 2023-01-10 PROCEDURE — 3080F PR MOST RECENT DIASTOLIC BLOOD PRESSURE >= 90 MM HG: ICD-10-PCS | Mod: CPTII,,, | Performed by: OBSTETRICS & GYNECOLOGY

## 2023-01-10 PROCEDURE — 3074F SYST BP LT 130 MM HG: CPT | Mod: CPTII,,, | Performed by: OBSTETRICS & GYNECOLOGY

## 2023-01-10 PROCEDURE — 3080F DIAST BP >= 90 MM HG: CPT | Mod: CPTII,,, | Performed by: OBSTETRICS & GYNECOLOGY

## 2023-01-10 PROCEDURE — 3074F PR MOST RECENT SYSTOLIC BLOOD PRESSURE < 130 MM HG: ICD-10-PCS | Mod: CPTII,,, | Performed by: OBSTETRICS & GYNECOLOGY

## 2023-01-10 PROCEDURE — 99214 PR OFFICE/OUTPT VISIT, EST, LEVL IV, 30-39 MIN: ICD-10-PCS | Mod: ,,, | Performed by: OBSTETRICS & GYNECOLOGY

## 2023-01-10 PROCEDURE — 3044F PR MOST RECENT HEMOGLOBIN A1C LEVEL <7.0%: ICD-10-PCS | Mod: CPTII,,, | Performed by: OBSTETRICS & GYNECOLOGY

## 2023-01-10 PROCEDURE — 4010F PR ACE/ARB THEARPY RXD/TAKEN: ICD-10-PCS | Mod: CPTII,,, | Performed by: OBSTETRICS & GYNECOLOGY

## 2023-01-10 PROCEDURE — 3044F HG A1C LEVEL LT 7.0%: CPT | Mod: CPTII,,, | Performed by: OBSTETRICS & GYNECOLOGY

## 2023-01-10 PROCEDURE — 99214 OFFICE O/P EST MOD 30 MIN: CPT | Mod: ,,, | Performed by: OBSTETRICS & GYNECOLOGY

## 2023-01-10 PROCEDURE — 3008F BODY MASS INDEX DOCD: CPT | Mod: CPTII,,, | Performed by: OBSTETRICS & GYNECOLOGY

## 2023-01-10 NOTE — PROGRESS NOTES
Subjective:       Patient ID: Sophy Orozco is a 32 y.o. female.    Chief Complaint: Follow-up (3 month f/u from 10/11/22)    This is a 31 y/o female here for follow-up after starting ERT for hot flashes. Pt states that her symptoms are improved and she is satisfied with her medicaiton    Review of Systems   All other systems reviewed and are negative.      Objective:      Physical Exam  Vitals reviewed. Exam conducted with a chaperone present.   Constitutional:       Appearance: Normal appearance.   HENT:      Head: Normocephalic and atraumatic.      Mouth/Throat:      Mouth: Mucous membranes are moist.   Eyes:      Extraocular Movements: Extraocular movements intact.      Pupils: Pupils are equal, round, and reactive to light.   Cardiovascular:      Rate and Rhythm: Normal rate and regular rhythm.      Pulses: Normal pulses.      Heart sounds: Normal heart sounds.   Pulmonary:      Effort: Pulmonary effort is normal.      Breath sounds: Normal breath sounds.   Abdominal:      General: Abdomen is flat. Bowel sounds are normal.      Palpations: Abdomen is soft.   Musculoskeletal:         General: Normal range of motion.      Cervical back: Normal range of motion.   Skin:     General: Skin is warm and dry.   Neurological:      General: No focal deficit present.      Mental Status: She is alert and oriented to person, place, and time.   Psychiatric:         Mood and Affect: Mood normal.         Behavior: Behavior normal.         Thought Content: Thought content normal.         Judgment: Judgment normal.       Assessment:       Problem List Items Addressed This Visit          Psychiatric    Anxiety    Depression     Other Visit Diagnoses       Menopausal symptom    -  Primary            Plan:       Continue Premarin      Pt states that her mood overall is improved as well aas the frequency and intensity of her hot flashes.

## 2023-02-09 ENCOUNTER — OFFICE VISIT (OUTPATIENT)
Dept: FAMILY MEDICINE | Facility: CLINIC | Age: 33
End: 2023-02-09
Payer: MEDICAID

## 2023-02-09 VITALS
DIASTOLIC BLOOD PRESSURE: 87 MMHG | BODY MASS INDEX: 35.56 KG/M2 | SYSTOLIC BLOOD PRESSURE: 124 MMHG | OXYGEN SATURATION: 98 % | WEIGHT: 254 LBS | HEART RATE: 90 BPM | HEIGHT: 71 IN

## 2023-02-09 DIAGNOSIS — F17.210 CIGARETTE NICOTINE DEPENDENCE WITHOUT COMPLICATION: ICD-10-CM

## 2023-02-09 DIAGNOSIS — R73.09 ELEVATED HEMOGLOBIN A1C: Primary | ICD-10-CM

## 2023-02-09 DIAGNOSIS — I10 ESSENTIAL HYPERTENSION: ICD-10-CM

## 2023-02-09 DIAGNOSIS — K21.9 GASTROESOPHAGEAL REFLUX DISEASE, UNSPECIFIED WHETHER ESOPHAGITIS PRESENT: ICD-10-CM

## 2023-02-09 DIAGNOSIS — F17.200 CURRENT EVERY DAY SMOKER: ICD-10-CM

## 2023-02-09 LAB
EST. AVERAGE GLUCOSE BLD GHB EST-MCNC: 127 MG/DL
HBA1C MFR BLD HPLC: 6.4 % (ref 4.5–6.6)

## 2023-02-09 PROCEDURE — 3074F PR MOST RECENT SYSTOLIC BLOOD PRESSURE < 130 MM HG: ICD-10-PCS | Mod: CPTII,,, | Performed by: NURSE PRACTITIONER

## 2023-02-09 PROCEDURE — 3079F PR MOST RECENT DIASTOLIC BLOOD PRESSURE 80-89 MM HG: ICD-10-PCS | Mod: CPTII,,, | Performed by: NURSE PRACTITIONER

## 2023-02-09 PROCEDURE — 1160F RVW MEDS BY RX/DR IN RCRD: CPT | Mod: CPTII,,, | Performed by: NURSE PRACTITIONER

## 2023-02-09 PROCEDURE — 99213 PR OFFICE/OUTPT VISIT, EST, LEVL III, 20-29 MIN: ICD-10-PCS | Mod: ,,, | Performed by: NURSE PRACTITIONER

## 2023-02-09 PROCEDURE — 3079F DIAST BP 80-89 MM HG: CPT | Mod: CPTII,,, | Performed by: NURSE PRACTITIONER

## 2023-02-09 PROCEDURE — 1159F PR MEDICATION LIST DOCUMENTED IN MEDICAL RECORD: ICD-10-PCS | Mod: CPTII,,, | Performed by: NURSE PRACTITIONER

## 2023-02-09 PROCEDURE — 83036 HEMOGLOBIN A1C: ICD-10-PCS | Mod: ,,, | Performed by: CLINICAL MEDICAL LABORATORY

## 2023-02-09 PROCEDURE — 83036 HEMOGLOBIN GLYCOSYLATED A1C: CPT | Mod: ,,, | Performed by: CLINICAL MEDICAL LABORATORY

## 2023-02-09 PROCEDURE — 3008F PR BODY MASS INDEX (BMI) DOCUMENTED: ICD-10-PCS | Mod: CPTII,,, | Performed by: NURSE PRACTITIONER

## 2023-02-09 PROCEDURE — 4010F ACE/ARB THERAPY RXD/TAKEN: CPT | Mod: CPTII,,, | Performed by: NURSE PRACTITIONER

## 2023-02-09 PROCEDURE — 4010F PR ACE/ARB THEARPY RXD/TAKEN: ICD-10-PCS | Mod: CPTII,,, | Performed by: NURSE PRACTITIONER

## 2023-02-09 PROCEDURE — 3074F SYST BP LT 130 MM HG: CPT | Mod: CPTII,,, | Performed by: NURSE PRACTITIONER

## 2023-02-09 PROCEDURE — 3008F BODY MASS INDEX DOCD: CPT | Mod: CPTII,,, | Performed by: NURSE PRACTITIONER

## 2023-02-09 PROCEDURE — 99213 OFFICE O/P EST LOW 20 MIN: CPT | Mod: ,,, | Performed by: NURSE PRACTITIONER

## 2023-02-09 PROCEDURE — 1159F MED LIST DOCD IN RCRD: CPT | Mod: CPTII,,, | Performed by: NURSE PRACTITIONER

## 2023-02-09 PROCEDURE — 1160F PR REVIEW ALL MEDS BY PRESCRIBER/CLIN PHARMACIST DOCUMENTED: ICD-10-PCS | Mod: CPTII,,, | Performed by: NURSE PRACTITIONER

## 2023-02-09 RX ORDER — LISINOPRIL 20 MG/1
20 TABLET ORAL DAILY
Qty: 90 TABLET | Refills: 1 | Status: SHIPPED | OUTPATIENT
Start: 2023-02-09 | End: 2023-08-02 | Stop reason: SDUPTHER

## 2023-02-09 RX ORDER — OMEPRAZOLE 20 MG/1
20 CAPSULE, DELAYED RELEASE ORAL DAILY
Qty: 90 CAPSULE | Refills: 1 | Status: SHIPPED | OUTPATIENT
Start: 2023-02-09 | End: 2023-08-02 | Stop reason: SDUPTHER

## 2023-02-09 RX ORDER — HYDROCHLOROTHIAZIDE 12.5 MG/1
12.5 TABLET ORAL DAILY
Qty: 90 TABLET | Refills: 0 | Status: SHIPPED | OUTPATIENT
Start: 2023-02-09 | End: 2023-08-02 | Stop reason: SDUPTHER

## 2023-02-09 NOTE — PROGRESS NOTES
"Clinic note     Patient name: Sophy Orozco is a 32 y.o. female   Chief compliant   Chief Complaint   Patient presents with    Medication Refill     No problems voiced at this time. Here for med refills and repeat lab.        Subjective     History of present illness   In clinic for routine follow up, lab and medication refills   Denies any acute concerns at present   Hx of HTN, anxiety and depression with symptoms well controlled on current medication  Screening A1c was 6.1, this will be repeated today   She is a daily smoker   Weight is up 19 # since office visit in November      Social History     Tobacco Use    Smoking status: Every Day     Packs/day: 0.25     Types: Cigars, Cigarettes     Passive exposure: Current    Smokeless tobacco: Never    Tobacco comments:     Trying to "cut back"    Substance Use Topics    Alcohol use: Yes     Comment: Social    Drug use: Yes     Types: Marijuana       Review of patient's allergies indicates:   Allergen Reactions    Clindamycin Hives     swelling       Past Medical History:   Diagnosis Date    Anxiety     Depression     Depression 10/11/2021    History of ovarian cyst     Hypertension        Past Surgical History:   Procedure Laterality Date     SECTION       x 1    CHOLECYSTECTOMY      LAPAROSCOPIC SALPINGO-OOPHORECTOMY Bilateral 2021    Procedure: SALPINGO-OOPHORECTOMY, LAPAROSCOPIC;  Surgeon: Bryce Juares MD;  Location: [facility];  Service: OB/GYN;  Laterality: Bilateral;    LAPAROSCOPIC TOTAL HYSTERECTOMY Bilateral 2021    Procedure: HYSTERECTOMY, TOTAL VAGINAL LAPAROSCOPIC;  Surgeon: Bryce Juares MD;  Location: [facility];  Service: OB/GYN;  Laterality: Bilateral;  vaginal - vpath     TUBAL LIGATION  2014        Family History   Problem Relation Age of Onset    Ovarian cancer Maternal Grandmother     Hypertension Father     Diabetes Father     Heart disease Father     Atrial fibrillation Father     Diabetes " "Mother     Hypertension Mother     Heart disease Mother     Breast cancer Son     Breast cancer Son          Current Outpatient Medications:     estrogens, conjugated, (PREMARIN) 0.625 MG tablet, Take 1 tablet (0.625 mg total) by mouth once daily., Disp: 30 tablet, Rfl: 11    hydroCHLOROthiazide (HYDRODIURIL) 12.5 MG Tab, Take 1 tablet (12.5 mg total) by mouth once daily., Disp: 90 tablet, Rfl: 0    lisinopriL (PRINIVIL,ZESTRIL) 20 MG tablet, Take 1 tablet (20 mg total) by mouth once daily., Disp: 90 tablet, Rfl: 1    omeprazole (PRILOSEC) 20 MG capsule, Take 1 capsule (20 mg total) by mouth once daily., Disp: 90 capsule, Rfl: 1    Review of Systems   Constitutional:  Negative for appetite change, chills, fatigue, fever and unexpected weight change.   Eyes:  Negative for visual disturbance.   Respiratory:  Negative for cough and shortness of breath.    Cardiovascular:  Negative for chest pain, palpitations and leg swelling.   Gastrointestinal:  Negative for abdominal pain, change in bowel habit, constipation, diarrhea, nausea, vomiting and change in bowel habit.   Endocrine: Negative for polydipsia and polyuria.   Genitourinary:  Negative for dysuria and frequency.   Musculoskeletal:  Negative for arthralgias, gait problem and myalgias.   Integumentary:  Negative for wound.   Neurological:  Negative for dizziness, syncope, light-headedness and headaches.   Psychiatric/Behavioral:  Negative for confusion, dysphoric mood and sleep disturbance. The patient is not nervous/anxious.      Objective     /87   Pulse 90   Ht 5' 11" (1.803 m)   Wt 115.2 kg (254 lb)   LMP 08/25/2021 (Exact Date)   SpO2 98%   BMI 35.43 kg/m²     Physical Exam   Constitutional: She is oriented to person, place, and time. She appears obese. No distress.   HENT:   Head: Atraumatic.   Mouth/Throat: Mucous membranes are moist.   Cardiovascular: Normal rate and regular rhythm. Pulmonary:      Effort: Pulmonary effort is normal. No " respiratory distress.      Breath sounds: Normal breath sounds. No wheezing, rhonchi or rales.     Abdominal: Soft. Bowel sounds are normal. She exhibits no distension. There is no abdominal tenderness.   Musculoskeletal:         General: Normal range of motion.      Cervical back: Neck supple.      Right lower leg: No edema.      Left lower leg: No edema.   Neurological: She is alert and oriented to person, place, and time. Gait normal.   Skin: Skin is warm and dry.   Psychiatric: Her behavior is normal. Mood normal.     Lab Results   Component Value Date    WBC 7.67 10/26/2022    HGB 14.4 10/26/2022    HCT 44.6 10/26/2022    MCV 83.1 10/26/2022     10/26/2022       CMP  Sodium   Date Value Ref Range Status   10/26/2022 141 136 - 145 mmol/L Final     Potassium   Date Value Ref Range Status   10/26/2022 4.0 3.5 - 5.1 mmol/L Final     Chloride   Date Value Ref Range Status   10/26/2022 107 98 - 107 mmol/L Final     CO2   Date Value Ref Range Status   10/26/2022 26 21 - 32 mmol/L Final     Glucose   Date Value Ref Range Status   10/26/2022 110 (H) 74 - 106 mg/dL Final     BUN   Date Value Ref Range Status   10/26/2022 12 7 - 18 mg/dL Final     Creatinine   Date Value Ref Range Status   10/26/2022 0.92 0.55 - 1.02 mg/dL Final     Calcium   Date Value Ref Range Status   10/26/2022 9.0 8.5 - 10.1 mg/dL Final     Total Protein   Date Value Ref Range Status   10/26/2022 7.2 6.4 - 8.2 g/dL Final     Albumin   Date Value Ref Range Status   10/26/2022 3.6 3.5 - 5.0 g/dL Final     Bilirubin, Total   Date Value Ref Range Status   10/26/2022 0.4 >0.0 - 1.2 mg/dL Final     Alk Phos   Date Value Ref Range Status   10/26/2022 99 (H) 37 - 98 U/L Final     AST   Date Value Ref Range Status   10/26/2022 12 (L) 15 - 37 U/L Final     ALT   Date Value Ref Range Status   10/26/2022 22 13 - 56 U/L Final     Anion Gap   Date Value Ref Range Status   10/26/2022 12 7 - 16 mmol/L Final     eGFR   Date Value Ref Range Status   10/11/2021  71 >=60 mL/min/1.73m² Final     Lab Results   Component Value Date    TSH 2.180 10/26/2022     Lab Results   Component Value Date    CHOL 189 10/26/2022    CHOL 158 10/11/2021     Lab Results   Component Value Date    HDL 35 (L) 10/26/2022    HDL 31 (L) 10/11/2021     Lab Results   Component Value Date    LDLCALC 128 10/26/2022    LDLCALC 106 10/11/2021     Lab Results   Component Value Date    TRIG 130 10/26/2022    TRIG 104 10/11/2021     Lab Results   Component Value Date    CHOLHDL 5.4 10/26/2022    CHOLHDL 5.1 10/11/2021     Lab Results   Component Value Date    HGBA1C 6.1 10/26/2022         Assessment and Plan   Elevated hemoglobin A1c  -     Hemoglobin A1C; Future; Expected date: 02/09/2023    Essential hypertension  -     hydroCHLOROthiazide (HYDRODIURIL) 12.5 MG Tab; Take 1 tablet (12.5 mg total) by mouth once daily.  Dispense: 90 tablet; Refill: 0  -     lisinopriL (PRINIVIL,ZESTRIL) 20 MG tablet; Take 1 tablet (20 mg total) by mouth once daily.  Dispense: 90 tablet; Refill: 1    Gastroesophageal reflux disease, unspecified whether esophagitis present  -     omeprazole (PRILOSEC) 20 MG capsule; Take 1 capsule (20 mg total) by mouth once daily.  Dispense: 90 capsule; Refill: 1    BMI 35.0-35.9,adult    Current every day smoker    Cigarette nicotine dependence without complication      Assistance with smoking cessation was offered, including:  []  Medications  []  Counseling  [x]  Printed Information on Smoking Cessation  []  Referral to a Smoking Cessation Program    Patient was counseled regarding smoking for 3-10 minutes.      Patient Instructions  Patient Instructions   Lab obtained in clinic today, we will notify you of results and any necessary changes to plan of care   Refills on routine medications   Follow up in three months and as needed

## 2023-02-23 ENCOUNTER — OFFICE VISIT (OUTPATIENT)
Dept: FAMILY MEDICINE | Facility: CLINIC | Age: 33
End: 2023-02-23
Payer: COMMERCIAL

## 2023-02-23 VITALS
HEIGHT: 71 IN | HEART RATE: 74 BPM | SYSTOLIC BLOOD PRESSURE: 127 MMHG | OXYGEN SATURATION: 98 % | BODY MASS INDEX: 35.56 KG/M2 | DIASTOLIC BLOOD PRESSURE: 92 MMHG | WEIGHT: 254 LBS

## 2023-02-23 DIAGNOSIS — F17.200 CURRENT EVERY DAY SMOKER: ICD-10-CM

## 2023-02-23 DIAGNOSIS — E78.5 HYPERLIPIDEMIA, UNSPECIFIED HYPERLIPIDEMIA TYPE: ICD-10-CM

## 2023-02-23 DIAGNOSIS — R73.03 PREDIABETES: Primary | ICD-10-CM

## 2023-02-23 DIAGNOSIS — I10 ESSENTIAL HYPERTENSION: ICD-10-CM

## 2023-02-23 DIAGNOSIS — F17.210 CIGARETTE NICOTINE DEPENDENCE WITHOUT COMPLICATION: ICD-10-CM

## 2023-02-23 PROCEDURE — 3074F PR MOST RECENT SYSTOLIC BLOOD PRESSURE < 130 MM HG: ICD-10-PCS | Mod: CPTII,,, | Performed by: NURSE PRACTITIONER

## 2023-02-23 PROCEDURE — 1160F PR REVIEW ALL MEDS BY PRESCRIBER/CLIN PHARMACIST DOCUMENTED: ICD-10-PCS | Mod: CPTII,,, | Performed by: NURSE PRACTITIONER

## 2023-02-23 PROCEDURE — 3080F DIAST BP >= 90 MM HG: CPT | Mod: CPTII,,, | Performed by: NURSE PRACTITIONER

## 2023-02-23 PROCEDURE — 3044F HG A1C LEVEL LT 7.0%: CPT | Mod: CPTII,,, | Performed by: NURSE PRACTITIONER

## 2023-02-23 PROCEDURE — 3044F PR MOST RECENT HEMOGLOBIN A1C LEVEL <7.0%: ICD-10-PCS | Mod: CPTII,,, | Performed by: NURSE PRACTITIONER

## 2023-02-23 PROCEDURE — 1159F MED LIST DOCD IN RCRD: CPT | Mod: CPTII,,, | Performed by: NURSE PRACTITIONER

## 2023-02-23 PROCEDURE — 1159F PR MEDICATION LIST DOCUMENTED IN MEDICAL RECORD: ICD-10-PCS | Mod: CPTII,,, | Performed by: NURSE PRACTITIONER

## 2023-02-23 PROCEDURE — 1160F RVW MEDS BY RX/DR IN RCRD: CPT | Mod: CPTII,,, | Performed by: NURSE PRACTITIONER

## 2023-02-23 PROCEDURE — 3080F PR MOST RECENT DIASTOLIC BLOOD PRESSURE >= 90 MM HG: ICD-10-PCS | Mod: CPTII,,, | Performed by: NURSE PRACTITIONER

## 2023-02-23 PROCEDURE — 99213 PR OFFICE/OUTPT VISIT, EST, LEVL III, 20-29 MIN: ICD-10-PCS | Mod: ,,, | Performed by: NURSE PRACTITIONER

## 2023-02-23 PROCEDURE — 3008F PR BODY MASS INDEX (BMI) DOCUMENTED: ICD-10-PCS | Mod: CPTII,,, | Performed by: NURSE PRACTITIONER

## 2023-02-23 PROCEDURE — 99213 OFFICE O/P EST LOW 20 MIN: CPT | Mod: ,,, | Performed by: NURSE PRACTITIONER

## 2023-02-23 PROCEDURE — 3074F SYST BP LT 130 MM HG: CPT | Mod: CPTII,,, | Performed by: NURSE PRACTITIONER

## 2023-02-23 PROCEDURE — 4010F PR ACE/ARB THEARPY RXD/TAKEN: ICD-10-PCS | Mod: CPTII,,, | Performed by: NURSE PRACTITIONER

## 2023-02-23 PROCEDURE — 4010F ACE/ARB THERAPY RXD/TAKEN: CPT | Mod: CPTII,,, | Performed by: NURSE PRACTITIONER

## 2023-02-23 PROCEDURE — 3008F BODY MASS INDEX DOCD: CPT | Mod: CPTII,,, | Performed by: NURSE PRACTITIONER

## 2023-02-23 RX ORDER — ATORVASTATIN CALCIUM 10 MG/1
10 TABLET, FILM COATED ORAL DAILY
Qty: 90 TABLET | Refills: 0 | Status: SHIPPED | OUTPATIENT
Start: 2023-02-23 | End: 2023-08-02 | Stop reason: SDUPTHER

## 2023-02-23 RX ORDER — SEMAGLUTIDE 1.34 MG/ML
INJECTION, SOLUTION SUBCUTANEOUS
Qty: 3 PEN | Refills: 0 | Status: SHIPPED | OUTPATIENT
Start: 2023-02-23 | End: 2023-05-10

## 2023-02-23 NOTE — PROGRESS NOTES
"Clinic note     Patient name: Sophy Orozco is a 32 y.o. female   Chief compliant   Chief Complaint   Patient presents with    Follow-up    Diabetes     New Dm follow on a1c.        Subjective     History of present illness   In clinic to discuss results of lab obtained on 2023  A1c was 6.4 which indicated new diagnosis of prediabetes  Discussed results, all question and concerns addressed   Discussed plate method diet recommendations and exercise recommendations   Medication options discussed, she denies any history or family history of thyroid cancer or MENs, she denies any history of pancreatitis or gastroparesis   Will trial Ozempic, dosing and possible side effects discussed, understanding verbalized; instructed to RTC after she picks up medication at pharmacy for staff to assist with administration instructions         Social History     Tobacco Use    Smoking status: Every Day     Packs/day: 0.25     Types: Cigars, Cigarettes     Passive exposure: Current    Smokeless tobacco: Never    Tobacco comments:     Trying to "cut back"    Substance Use Topics    Alcohol use: Yes     Comment: Social    Drug use: Yes     Types: Marijuana       Review of patient's allergies indicates:   Allergen Reactions    Clindamycin Hives     swelling       Past Medical History:   Diagnosis Date    Anxiety     Depression     Depression 10/11/2021    History of ovarian cyst     Hypertension        Past Surgical History:   Procedure Laterality Date     SECTION       x 1    CHOLECYSTECTOMY      LAPAROSCOPIC SALPINGO-OOPHORECTOMY Bilateral 2021    Procedure: SALPINGO-OOPHORECTOMY, LAPAROSCOPIC;  Surgeon: Bryce Juares MD;  Location: [facility];  Service: OB/GYN;  Laterality: Bilateral;    LAPAROSCOPIC TOTAL HYSTERECTOMY Bilateral 2021    Procedure: HYSTERECTOMY, TOTAL VAGINAL LAPAROSCOPIC;  Surgeon: Bryce Juares MD;  [facility];  Service: OB/GYN;  Laterality: Bilateral;  " vaginal - vpath     TUBAL LIGATION  12/2014        Family History   Problem Relation Age of Onset    Ovarian cancer Maternal Grandmother     Hypertension Father     Diabetes Father     Heart disease Father     Atrial fibrillation Father     Diabetes Mother     Hypertension Mother     Heart disease Mother     Breast cancer Son     Breast cancer Son          Current Outpatient Medications:     estrogens, conjugated, (PREMARIN) 0.625 MG tablet, Take 1 tablet (0.625 mg total) by mouth once daily., Disp: 30 tablet, Rfl: 11    hydroCHLOROthiazide (HYDRODIURIL) 12.5 MG Tab, Take 1 tablet (12.5 mg total) by mouth once daily., Disp: 90 tablet, Rfl: 0    lisinopriL (PRINIVIL,ZESTRIL) 20 MG tablet, Take 1 tablet (20 mg total) by mouth once daily., Disp: 90 tablet, Rfl: 1    omeprazole (PRILOSEC) 20 MG capsule, Take 1 capsule (20 mg total) by mouth once daily., Disp: 90 capsule, Rfl: 1    atorvastatin (LIPITOR) 10 MG tablet, Take 1 tablet (10 mg total) by mouth once daily., Disp: 90 tablet, Rfl: 0    semaglutide (OZEMPIC) 0.25 mg or 0.5 mg(2 mg/1.5 mL) pen injector, Inject 0.25 mg SC one time weekly for four weeks then increase to 0.5 mg SC one time weekly, Disp: 3 pen, Rfl: 0    Review of Systems   Constitutional:  Negative for appetite change, chills, fatigue, fever and unexpected weight change.   Respiratory:  Negative for cough and shortness of breath.    Cardiovascular:  Negative for chest pain, palpitations and leg swelling.   Gastrointestinal:  Negative for abdominal pain, change in bowel habit, constipation, diarrhea, nausea, vomiting and change in bowel habit.   Endocrine: Negative for polydipsia and polyuria.   Genitourinary:  Negative for dysuria and frequency.   Musculoskeletal:  Negative for arthralgias, gait problem and myalgias.   Neurological:  Negative for dizziness, syncope, light-headedness and headaches.   Psychiatric/Behavioral:  Negative for dysphoric mood and sleep disturbance. The patient is not  "nervous/anxious.      Objective     BP (!) 127/92 (BP Location: Right arm, Patient Position: Sitting)   Pulse 74   Ht 5' 11" (1.803 m)   Wt 115.2 kg (254 lb)   LMP 08/25/2021 (Exact Date)   SpO2 98%   BMI 35.43 kg/m²     Physical Exam   Constitutional: She is oriented to person, place, and time. No distress.   HENT:   Head: Atraumatic.   Mouth/Throat: Mucous membranes are moist.   Cardiovascular: Normal rate and regular rhythm. Pulmonary:      Effort: Pulmonary effort is normal. No respiratory distress.      Breath sounds: Normal breath sounds. No wheezing, rhonchi or rales.     Abdominal: Soft. Bowel sounds are normal. She exhibits no distension. There is no abdominal tenderness.   Musculoskeletal:         General: Normal range of motion.      Cervical back: Neck supple.      Right lower leg: No edema.      Left lower leg: No edema.   Neurological: She is alert and oriented to person, place, and time. Gait normal.   Skin: Skin is warm and dry.   Psychiatric: Her behavior is normal. Mood normal.     Lab Results   Component Value Date    WBC 7.67 10/26/2022    HGB 14.4 10/26/2022    HCT 44.6 10/26/2022    MCV 83.1 10/26/2022     10/26/2022       CMP  Sodium   Date Value Ref Range Status   10/26/2022 141 136 - 145 mmol/L Final     Potassium   Date Value Ref Range Status   10/26/2022 4.0 3.5 - 5.1 mmol/L Final     Chloride   Date Value Ref Range Status   10/26/2022 107 98 - 107 mmol/L Final     CO2   Date Value Ref Range Status   10/26/2022 26 21 - 32 mmol/L Final     Glucose   Date Value Ref Range Status   10/26/2022 110 (H) 74 - 106 mg/dL Final     BUN   Date Value Ref Range Status   10/26/2022 12 7 - 18 mg/dL Final     Creatinine   Date Value Ref Range Status   10/26/2022 0.92 0.55 - 1.02 mg/dL Final     Calcium   Date Value Ref Range Status   10/26/2022 9.0 8.5 - 10.1 mg/dL Final     Total Protein   Date Value Ref Range Status   10/26/2022 7.2 6.4 - 8.2 g/dL Final     Albumin   Date Value Ref Range " Status   10/26/2022 3.6 3.5 - 5.0 g/dL Final     Bilirubin, Total   Date Value Ref Range Status   10/26/2022 0.4 >0.0 - 1.2 mg/dL Final     Alk Phos   Date Value Ref Range Status   10/26/2022 99 (H) 37 - 98 U/L Final     AST   Date Value Ref Range Status   10/26/2022 12 (L) 15 - 37 U/L Final     ALT   Date Value Ref Range Status   10/26/2022 22 13 - 56 U/L Final     Anion Gap   Date Value Ref Range Status   10/26/2022 12 7 - 16 mmol/L Final     eGFR   Date Value Ref Range Status   10/11/2021 71 >=60 mL/min/1.73m² Final     Lab Results   Component Value Date    TSH 2.180 10/26/2022     Lab Results   Component Value Date    CHOL 189 10/26/2022    CHOL 158 10/11/2021     Lab Results   Component Value Date    HDL 35 (L) 10/26/2022    HDL 31 (L) 10/11/2021     Lab Results   Component Value Date    LDLCALC 128 10/26/2022    LDLCALC 106 10/11/2021     Lab Results   Component Value Date    TRIG 130 10/26/2022    TRIG 104 10/11/2021     Lab Results   Component Value Date    CHOLHDL 5.4 10/26/2022    CHOLHDL 5.1 10/11/2021     Lab Results   Component Value Date    HGBA1C 6.4 02/09/2023         Assessment and Plan   Prediabetes  -     semaglutide (OZEMPIC) 0.25 mg or 0.5 mg(2 mg/1.5 mL) pen injector; Inject 0.25 mg SC one time weekly for four weeks then increase to 0.5 mg SC one time weekly  Dispense: 3 pen; Refill: 0    Essential hypertension    Hyperlipidemia, unspecified hyperlipidemia type  -     atorvastatin (LIPITOR) 10 MG tablet; Take 1 tablet (10 mg total) by mouth once daily.  Dispense: 90 tablet; Refill: 0    BMI 35.0-35.9,adult    Current every day smoker    Cigarette nicotine dependence without complication    Assistance with smoking cessation was offered, including:  []  Medications  []  Counseling  [x]  Printed Information on Smoking Cessation  []  Referral to a Smoking Cessation Program            Patient Instructions  Patient Instructions   Educational handouts to pt: What is prediabetes, Reading a nutrition  facts label, Foot care for people with diabetes, Low blood sugar, High blood sugar, Building a balanced meal the plate method, Know your numbers     Start Ozempic, when you  medication from pharmacy, come by clinic and staff will assist with administration   Start Lipitor as prescribed, take at bedtime     Follow up in clinic in one month from starting Ozempic       Ensure to take medications as directed.      Follow diabetic diet as directed.      Work to achieve normal body weight.     Ensure to exercise 4-5 times per week for 20 minutes.

## 2023-02-23 NOTE — PATIENT INSTRUCTIONS
Educational handouts to pt: What is prediabetes, Reading a nutrition facts label, Foot care for people with diabetes, Low blood sugar, High blood sugar, Building a balanced meal the plate method, Know your numbers     Start Ozempic, when you  medication from pharmacy, come by clinic and staff will assist with administration   Start Lipitor as prescribed, take at bedtime     Follow up in clinic in one month from starting Ozempic       Ensure to take medications as directed.      Follow diabetic diet as directed.      Work to achieve normal body weight.     Ensure to exercise 4-5 times per week for 20 minutes.

## 2023-05-08 PROBLEM — E78.5 HYPERLIPIDEMIA: Status: ACTIVE | Noted: 2023-05-08

## 2023-05-08 PROBLEM — R73.03 PREDIABETES: Status: ACTIVE | Noted: 2023-05-08

## 2023-05-10 ENCOUNTER — TELEPHONE (OUTPATIENT)
Dept: FAMILY MEDICINE | Facility: CLINIC | Age: 33
End: 2023-05-10
Payer: MEDICAID

## 2023-05-10 DIAGNOSIS — R73.03 PREDIABETES: Primary | ICD-10-CM

## 2023-05-10 RX ORDER — LIRAGLUTIDE 6 MG/ML
INJECTION SUBCUTANEOUS
Qty: 6 ML | Refills: 2 | Status: SHIPPED | OUTPATIENT
Start: 2023-05-10 | End: 2023-08-02 | Stop reason: SDUPTHER

## 2023-05-10 NOTE — TELEPHONE ENCOUNTER
Pt called to state that her insurance denied her PA for Ozempic. Pt states she is willing to try Victoza if you will send her something in to [facility]. Please advise and I will call pt back to discuss your decision. Thanks

## 2023-05-10 NOTE — TELEPHONE ENCOUNTER
Rx for victoza sent to pharmacy, insurance may not approve for prediabetes if not I will write metformin

## 2023-08-03 ENCOUNTER — OFFICE VISIT (OUTPATIENT)
Dept: FAMILY MEDICINE | Facility: CLINIC | Age: 33
End: 2023-08-03
Payer: COMMERCIAL

## 2023-08-03 VITALS
HEIGHT: 71 IN | WEIGHT: 254 LBS | HEART RATE: 89 BPM | DIASTOLIC BLOOD PRESSURE: 83 MMHG | BODY MASS INDEX: 35.56 KG/M2 | SYSTOLIC BLOOD PRESSURE: 114 MMHG | OXYGEN SATURATION: 98 %

## 2023-08-03 DIAGNOSIS — E78.5 HYPERLIPIDEMIA, UNSPECIFIED HYPERLIPIDEMIA TYPE: ICD-10-CM

## 2023-08-03 DIAGNOSIS — R53.83 FATIGUE, UNSPECIFIED TYPE: ICD-10-CM

## 2023-08-03 DIAGNOSIS — R11.0 NAUSEA: ICD-10-CM

## 2023-08-03 DIAGNOSIS — F32.A DEPRESSION, UNSPECIFIED DEPRESSION TYPE: ICD-10-CM

## 2023-08-03 DIAGNOSIS — F41.9 ANXIETY: ICD-10-CM

## 2023-08-03 DIAGNOSIS — R73.03 PREDIABETES: Primary | ICD-10-CM

## 2023-08-03 DIAGNOSIS — K21.9 GASTROESOPHAGEAL REFLUX DISEASE, UNSPECIFIED WHETHER ESOPHAGITIS PRESENT: ICD-10-CM

## 2023-08-03 DIAGNOSIS — F17.200 CURRENT EVERY DAY SMOKER: ICD-10-CM

## 2023-08-03 DIAGNOSIS — I10 ESSENTIAL HYPERTENSION: ICD-10-CM

## 2023-08-03 PROCEDURE — 3044F PR MOST RECENT HEMOGLOBIN A1C LEVEL <7.0%: ICD-10-PCS | Mod: CPTII,,, | Performed by: NURSE PRACTITIONER

## 2023-08-03 PROCEDURE — 1159F MED LIST DOCD IN RCRD: CPT | Mod: CPTII,,, | Performed by: NURSE PRACTITIONER

## 2023-08-03 PROCEDURE — 1159F PR MEDICATION LIST DOCUMENTED IN MEDICAL RECORD: ICD-10-PCS | Mod: CPTII,,, | Performed by: NURSE PRACTITIONER

## 2023-08-03 PROCEDURE — 1160F RVW MEDS BY RX/DR IN RCRD: CPT | Mod: CPTII,,, | Performed by: NURSE PRACTITIONER

## 2023-08-03 PROCEDURE — 3079F DIAST BP 80-89 MM HG: CPT | Mod: CPTII,,, | Performed by: NURSE PRACTITIONER

## 2023-08-03 PROCEDURE — 3008F BODY MASS INDEX DOCD: CPT | Mod: CPTII,,, | Performed by: NURSE PRACTITIONER

## 2023-08-03 PROCEDURE — 3079F PR MOST RECENT DIASTOLIC BLOOD PRESSURE 80-89 MM HG: ICD-10-PCS | Mod: CPTII,,, | Performed by: NURSE PRACTITIONER

## 2023-08-03 PROCEDURE — 3074F SYST BP LT 130 MM HG: CPT | Mod: CPTII,,, | Performed by: NURSE PRACTITIONER

## 2023-08-03 PROCEDURE — 4010F ACE/ARB THERAPY RXD/TAKEN: CPT | Mod: CPTII,,, | Performed by: NURSE PRACTITIONER

## 2023-08-03 PROCEDURE — 3074F PR MOST RECENT SYSTOLIC BLOOD PRESSURE < 130 MM HG: ICD-10-PCS | Mod: CPTII,,, | Performed by: NURSE PRACTITIONER

## 2023-08-03 PROCEDURE — 99214 PR OFFICE/OUTPT VISIT, EST, LEVL IV, 30-39 MIN: ICD-10-PCS | Mod: 25,,, | Performed by: NURSE PRACTITIONER

## 2023-08-03 PROCEDURE — 99214 OFFICE O/P EST MOD 30 MIN: CPT | Mod: 25,,, | Performed by: NURSE PRACTITIONER

## 2023-08-03 PROCEDURE — 1160F PR REVIEW ALL MEDS BY PRESCRIBER/CLIN PHARMACIST DOCUMENTED: ICD-10-PCS | Mod: CPTII,,, | Performed by: NURSE PRACTITIONER

## 2023-08-03 PROCEDURE — 3008F PR BODY MASS INDEX (BMI) DOCUMENTED: ICD-10-PCS | Mod: CPTII,,, | Performed by: NURSE PRACTITIONER

## 2023-08-03 PROCEDURE — 99406 BEHAV CHNG SMOKING 3-10 MIN: CPT | Mod: ,,, | Performed by: NURSE PRACTITIONER

## 2023-08-03 PROCEDURE — 4010F PR ACE/ARB THEARPY RXD/TAKEN: ICD-10-PCS | Mod: CPTII,,, | Performed by: NURSE PRACTITIONER

## 2023-08-03 PROCEDURE — 3044F HG A1C LEVEL LT 7.0%: CPT | Mod: CPTII,,, | Performed by: NURSE PRACTITIONER

## 2023-08-03 PROCEDURE — 99406 PR TOBACCO USE CESSATION INTERMEDIATE 3-10 MINUTES: ICD-10-PCS | Mod: ,,, | Performed by: NURSE PRACTITIONER

## 2023-08-03 RX ORDER — LIRAGLUTIDE 6 MG/ML
1.2 INJECTION SUBCUTANEOUS DAILY
Qty: 18 ML | Refills: 0 | Status: SHIPPED | OUTPATIENT
Start: 2023-08-03 | End: 2023-08-08 | Stop reason: ALTCHOICE

## 2023-08-03 RX ORDER — ONDANSETRON 8 MG/1
8 TABLET, ORALLY DISINTEGRATING ORAL EVERY 12 HOURS PRN
Qty: 10 TABLET | Refills: 0 | Status: SHIPPED | OUTPATIENT
Start: 2023-08-03 | End: 2023-10-18 | Stop reason: ALTCHOICE

## 2023-08-03 RX ORDER — LISINOPRIL 20 MG/1
20 TABLET ORAL DAILY
Qty: 90 TABLET | Refills: 1 | Status: SHIPPED | OUTPATIENT
Start: 2023-08-03 | End: 2023-10-18 | Stop reason: SDUPTHER

## 2023-08-03 RX ORDER — HYDROCHLOROTHIAZIDE 12.5 MG/1
12.5 TABLET ORAL DAILY
Qty: 90 TABLET | Refills: 0 | Status: SHIPPED | OUTPATIENT
Start: 2023-08-03 | End: 2023-10-18 | Stop reason: SDUPTHER

## 2023-08-03 RX ORDER — OMEPRAZOLE 20 MG/1
20 CAPSULE, DELAYED RELEASE ORAL DAILY
Qty: 90 CAPSULE | Refills: 1 | Status: SHIPPED | OUTPATIENT
Start: 2023-08-03 | End: 2023-10-18 | Stop reason: SDUPTHER

## 2023-08-03 RX ORDER — PEN NEEDLE, DIABETIC 29 G X1/2"
NEEDLE, DISPOSABLE MISCELLANEOUS
COMMUNITY
Start: 2023-05-11 | End: 2023-10-18 | Stop reason: ALTCHOICE

## 2023-08-03 RX ORDER — ATORVASTATIN CALCIUM 10 MG/1
10 TABLET, FILM COATED ORAL DAILY
Qty: 90 TABLET | Refills: 0 | Status: SHIPPED | OUTPATIENT
Start: 2023-08-03 | End: 2023-10-18 | Stop reason: SDUPTHER

## 2023-08-03 NOTE — PATIENT INSTRUCTIONS
Lab obtained in clinic today, we will notify you of results and any necessary changes to plan of care   Refills on routine medications   Follow up on 10/26/2023 and as needed, routine medication will be due again on 11/1/2023       Zofran as needed for nausea, if symptoms do not resolve over the next couple of weeks, call clinic

## 2023-08-03 NOTE — PROGRESS NOTES
"Clinic note     Patient name: Sophy Orozco is a 32 y.o. female   Chief compliant   Chief Complaint   Patient presents with    Diabetes     States she has been n/v for the last month feels like it is due to the victoza. States sugar has been good. C/o of fatigue. That she feels better just has no energy.        Subjective     History of present illness   In clinic for routine follow up and medication refills   Reports some occasional nausea which she r/t Victoza, discussed options, will continue Victoza for now and see if nausea improves   Denies any constipation, diarrhea, abdominal pain  She reports some increase fatigue, will obtain b12 today, TSH was normal in October    Past Medical History: prediabetes, HTN, HLD, GERD, depression, anxiety    Last A1c 6.4, this will be rechecked today                 Social History     Tobacco Use    Smoking status: Former     Current packs/day: 0.00     Types: Cigars, Cigarettes     Passive exposure: Current    Smokeless tobacco: Never    Tobacco comments:     Trying to "cut back"    Substance Use Topics    Alcohol use: Yes     Comment: Social    Drug use: Yes     Types: Marijuana       Review of patient's allergies indicates:   Allergen Reactions    Clindamycin Hives     swelling       Past Medical History:   Diagnosis Date    Anxiety     Depression     Depression 10/11/2021    Essential hypertension 10/11/2021    Gastroesophageal reflux disease 10/11/2021    History of ovarian cyst     Hyperlipidemia 2023    Hypertension     Prediabetes 2023       Past Surgical History:   Procedure Laterality Date     SECTION       x 1    CHOLECYSTECTOMY      LAPAROSCOPIC SALPINGO-OOPHORECTOMY Bilateral 2021    Procedure: SALPINGO-OOPHORECTOMY, LAPAROSCOPIC;  Surgeon: Bryce Juares MD;  Location: [facility];  Service: OB/GYN;  Laterality: Bilateral;    LAPAROSCOPIC TOTAL HYSTERECTOMY Bilateral 2021    Procedure: HYSTERECTOMY, TOTAL VAGINAL " "LAPAROSCOPIC;  Surgeon: Bryce Juares MD;  Location: [facility];  Service: OB/GYN;  Laterality: Bilateral;  vaginal - vpath     TUBAL LIGATION  12/2014        Family History   Problem Relation Age of Onset    Ovarian cancer Maternal Grandmother     Hypertension Father     Diabetes Father     Heart disease Father     Atrial fibrillation Father     Diabetes Mother     Hypertension Mother     Heart disease Mother     Breast cancer Son     Breast cancer Son          Current Outpatient Medications:     atorvastatin (LIPITOR) 10 MG tablet, Take 1 tablet (10 mg total) by mouth once daily., Disp: 90 tablet, Rfl: 0    estrogens, conjugated, (PREMARIN) 0.625 MG tablet, Take 1 tablet (0.625 mg total) by mouth once daily., Disp: 30 tablet, Rfl: 11    hydroCHLOROthiazide (HYDRODIURIL) 12.5 MG Tab, Take 1 tablet (12.5 mg total) by mouth once daily., Disp: 90 tablet, Rfl: 0    liraglutide 0.6 mg/0.1 mL, 18 mg/3 mL, subq PNIJ (VICTOZA 3-ADDIE) 0.6 mg/0.1 mL (18 mg/3 mL) PnIj pen, Inject 0.6 mg SC daily for one week then increase to 1.2 MG SC daily, Disp: 6 mL, Rfl: 2    lisinopriL (PRINIVIL,ZESTRIL) 20 MG tablet, Take 1 tablet (20 mg total) by mouth once daily., Disp: 90 tablet, Rfl: 1    omeprazole (PRILOSEC) 20 MG capsule, Take 1 capsule (20 mg total) by mouth once daily., Disp: 90 capsule, Rfl: 1    ULTICARE PEN NEEDLE 32 gauge x 1/4" Ndle, USE EVERY DAY AS DIRECTED, Disp: , Rfl:     Review of Systems   Constitutional:  Positive for fatigue. Negative for appetite change, chills, fever and unexpected weight change.   Respiratory:  Negative for cough and shortness of breath.    Cardiovascular:  Negative for chest pain, palpitations and leg swelling.   Gastrointestinal:  Positive for nausea. Negative for abdominal pain, change in bowel habit, constipation, diarrhea, vomiting and change in bowel habit.   Genitourinary:  Negative for dysuria and frequency.   Musculoskeletal:  Negative for arthralgias, gait problem and " "myalgias.   Neurological:  Negative for dizziness, syncope, light-headedness and headaches.   Psychiatric/Behavioral:  Negative for dysphoric mood and sleep disturbance. The patient is not nervous/anxious.        Objective     /83   Pulse 89   Ht 5' 11" (1.803 m)   Wt 115.2 kg (254 lb)   LMP 08/25/2021 (Exact Date)   SpO2 98%   BMI 35.43 kg/m²     Physical Exam   Constitutional: She is oriented to person, place, and time. No distress.   HENT:   Head: Atraumatic.   Mouth/Throat: Mucous membranes are moist.   Cardiovascular: Normal rate and regular rhythm. Pulmonary:      Effort: Pulmonary effort is normal. No respiratory distress.      Breath sounds: Normal breath sounds. No wheezing, rhonchi or rales.     Abdominal: Soft. Bowel sounds are normal. She exhibits no distension. There is no abdominal tenderness.   Musculoskeletal:         General: Normal range of motion.      Cervical back: Neck supple.      Right lower leg: No edema.      Left lower leg: No edema.   Neurological: She is alert and oriented to person, place, and time. Gait normal.   Skin: Skin is warm and dry.   Psychiatric: Her behavior is normal. Mood normal.       Lab Results   Component Value Date    WBC 7.67 10/26/2022    HGB 14.4 10/26/2022    HCT 44.6 10/26/2022    MCV 83.1 10/26/2022     10/26/2022       CMP  Sodium   Date Value Ref Range Status   10/26/2022 141 136 - 145 mmol/L Final     Potassium   Date Value Ref Range Status   10/26/2022 4.0 3.5 - 5.1 mmol/L Final     Chloride   Date Value Ref Range Status   10/26/2022 107 98 - 107 mmol/L Final     CO2   Date Value Ref Range Status   10/26/2022 26 21 - 32 mmol/L Final     Glucose   Date Value Ref Range Status   10/26/2022 110 (H) 74 - 106 mg/dL Final     BUN   Date Value Ref Range Status   10/26/2022 12 7 - 18 mg/dL Final     Creatinine   Date Value Ref Range Status   10/26/2022 0.92 0.55 - 1.02 mg/dL Final     Calcium   Date Value Ref Range Status   10/26/2022 9.0 8.5 - 10.1 " mg/dL Final     Total Protein   Date Value Ref Range Status   10/26/2022 7.2 6.4 - 8.2 g/dL Final     Albumin   Date Value Ref Range Status   10/26/2022 3.6 3.5 - 5.0 g/dL Final     Bilirubin, Total   Date Value Ref Range Status   10/26/2022 0.4 >0.0 - 1.2 mg/dL Final     Alk Phos   Date Value Ref Range Status   10/26/2022 99 (H) 37 - 98 U/L Final     AST   Date Value Ref Range Status   10/26/2022 12 (L) 15 - 37 U/L Final     ALT   Date Value Ref Range Status   10/26/2022 22 13 - 56 U/L Final     Anion Gap   Date Value Ref Range Status   10/26/2022 12 7 - 16 mmol/L Final     eGFR   Date Value Ref Range Status   10/11/2021 71 >=60 mL/min/1.73m² Final     Lab Results   Component Value Date    TSH 2.180 10/26/2022     Lab Results   Component Value Date    CHOL 189 10/26/2022    CHOL 158 10/11/2021     Lab Results   Component Value Date    HDL 35 (L) 10/26/2022    HDL 31 (L) 10/11/2021     Lab Results   Component Value Date    LDLCALC 128 10/26/2022    LDLCALC 106 10/11/2021     Lab Results   Component Value Date    TRIG 130 10/26/2022    TRIG 104 10/11/2021     Lab Results   Component Value Date    CHOLHDL 5.4 10/26/2022    CHOLHDL 5.1 10/11/2021     Lab Results   Component Value Date    HGBA1C 6.4 02/09/2023         Assessment and Plan   Prediabetes    Essential hypertension    Gastroesophageal reflux disease, unspecified whether esophagitis present    Hyperlipidemia, unspecified hyperlipidemia type    Depression, unspecified depression type    Anxiety    Current every day smoker    Assistance with smoking cessation was offered, including:  []  Medications  [x]  Counseling  [x]  Printed Information on Smoking Cessation  []  Referral to a Smoking Cessation Program    Patient was counseled regarding smoking for 3-10 minutes.        Patient Instructions  Patient Instructions   Lab obtained in clinic today, we will notify you of results and any necessary changes to plan of care   Refills on routine medications   Follow up  on 10/26/2023 and as needed, routine medication will be due again on 11/1/2023

## 2023-08-07 ENCOUNTER — TELEPHONE (OUTPATIENT)
Dept: FAMILY MEDICINE | Facility: CLINIC | Age: 33
End: 2023-08-07
Payer: COMMERCIAL

## 2023-08-07 NOTE — TELEPHONE ENCOUNTER
We called to inform pt that her Victoza was denied through her insurance. Pt states her  was laid off and they lost their [employer] insurance. Pt states she has applied for medicaid and is waiting for insurance approval or denial. Pt asking if you can start her on something else until she can get her insurance sorted out. Please advise.

## 2023-08-08 DIAGNOSIS — R73.03 PREDIABETES: Primary | ICD-10-CM

## 2023-08-08 RX ORDER — METFORMIN HYDROCHLORIDE 500 MG/1
500 TABLET ORAL 2 TIMES DAILY WITH MEALS
Qty: 180 TABLET | Refills: 0 | Status: SHIPPED | OUTPATIENT
Start: 2023-08-08 | End: 2023-10-18 | Stop reason: SDUPTHER

## 2023-08-08 NOTE — TELEPHONE ENCOUNTER
Spoke with pt over the phone, gave pt instructions on medication change. Pt states she understands, no further questions at this time.

## 2023-08-08 NOTE — TELEPHONE ENCOUNTER
Notify pt: victoza d/c'd; rx for metformin 500 BID sent to pharmacy, start the day after you stop victoza.

## 2023-08-25 ENCOUNTER — OFFICE VISIT (OUTPATIENT)
Dept: FAMILY MEDICINE | Facility: CLINIC | Age: 33
End: 2023-08-25
Payer: COMMERCIAL

## 2023-08-25 VITALS
WEIGHT: 253 LBS | DIASTOLIC BLOOD PRESSURE: 84 MMHG | HEIGHT: 71 IN | SYSTOLIC BLOOD PRESSURE: 153 MMHG | HEART RATE: 61 BPM | BODY MASS INDEX: 35.42 KG/M2

## 2023-08-25 DIAGNOSIS — J01.90 ACUTE SINUSITIS, RECURRENCE NOT SPECIFIED, UNSPECIFIED LOCATION: Primary | ICD-10-CM

## 2023-08-25 DIAGNOSIS — H65.193 ACUTE MIDDLE EAR EFFUSION, BILATERAL: ICD-10-CM

## 2023-08-25 DIAGNOSIS — Z20.9 EXPOSURE TO COMMUNICABLE DISEASE: ICD-10-CM

## 2023-08-25 PROCEDURE — 99213 OFFICE O/P EST LOW 20 MIN: CPT | Mod: 25,,, | Performed by: NURSE PRACTITIONER

## 2023-08-25 PROCEDURE — 1160F RVW MEDS BY RX/DR IN RCRD: CPT | Mod: CPTII,,, | Performed by: NURSE PRACTITIONER

## 2023-08-25 PROCEDURE — 3079F PR MOST RECENT DIASTOLIC BLOOD PRESSURE 80-89 MM HG: ICD-10-PCS | Mod: CPTII,,, | Performed by: NURSE PRACTITIONER

## 2023-08-25 PROCEDURE — 96372 THER/PROPH/DIAG INJ SC/IM: CPT | Mod: ,,, | Performed by: NURSE PRACTITIONER

## 2023-08-25 PROCEDURE — 3008F PR BODY MASS INDEX (BMI) DOCUMENTED: ICD-10-PCS | Mod: CPTII,,, | Performed by: NURSE PRACTITIONER

## 2023-08-25 PROCEDURE — 3044F PR MOST RECENT HEMOGLOBIN A1C LEVEL <7.0%: ICD-10-PCS | Mod: CPTII,,, | Performed by: NURSE PRACTITIONER

## 2023-08-25 PROCEDURE — 3077F PR MOST RECENT SYSTOLIC BLOOD PRESSURE >= 140 MM HG: ICD-10-PCS | Mod: CPTII,,, | Performed by: NURSE PRACTITIONER

## 2023-08-25 PROCEDURE — 4010F ACE/ARB THERAPY RXD/TAKEN: CPT | Mod: CPTII,,, | Performed by: NURSE PRACTITIONER

## 2023-08-25 PROCEDURE — 1159F PR MEDICATION LIST DOCUMENTED IN MEDICAL RECORD: ICD-10-PCS | Mod: CPTII,,, | Performed by: NURSE PRACTITIONER

## 2023-08-25 PROCEDURE — 1160F PR REVIEW ALL MEDS BY PRESCRIBER/CLIN PHARMACIST DOCUMENTED: ICD-10-PCS | Mod: CPTII,,, | Performed by: NURSE PRACTITIONER

## 2023-08-25 PROCEDURE — 4010F PR ACE/ARB THEARPY RXD/TAKEN: ICD-10-PCS | Mod: CPTII,,, | Performed by: NURSE PRACTITIONER

## 2023-08-25 PROCEDURE — 1159F MED LIST DOCD IN RCRD: CPT | Mod: CPTII,,, | Performed by: NURSE PRACTITIONER

## 2023-08-25 PROCEDURE — 3044F HG A1C LEVEL LT 7.0%: CPT | Mod: CPTII,,, | Performed by: NURSE PRACTITIONER

## 2023-08-25 PROCEDURE — 99213 PR OFFICE/OUTPT VISIT, EST, LEVL III, 20-29 MIN: ICD-10-PCS | Mod: 25,,, | Performed by: NURSE PRACTITIONER

## 2023-08-25 PROCEDURE — 3077F SYST BP >= 140 MM HG: CPT | Mod: CPTII,,, | Performed by: NURSE PRACTITIONER

## 2023-08-25 PROCEDURE — 96372 PR INJECTION,THERAP/PROPH/DIAG2ST, IM OR SUBCUT: ICD-10-PCS | Mod: ,,, | Performed by: NURSE PRACTITIONER

## 2023-08-25 PROCEDURE — 3008F BODY MASS INDEX DOCD: CPT | Mod: CPTII,,, | Performed by: NURSE PRACTITIONER

## 2023-08-25 PROCEDURE — 3079F DIAST BP 80-89 MM HG: CPT | Mod: CPTII,,, | Performed by: NURSE PRACTITIONER

## 2023-08-25 RX ORDER — CEFTRIAXONE 1 G/1
1 INJECTION, POWDER, FOR SOLUTION INTRAMUSCULAR; INTRAVENOUS
Status: COMPLETED | OUTPATIENT
Start: 2023-08-25 | End: 2023-08-25

## 2023-08-25 RX ORDER — DEXAMETHASONE SODIUM PHOSPHATE 4 MG/ML
4 INJECTION, SOLUTION INTRA-ARTICULAR; INTRALESIONAL; INTRAMUSCULAR; INTRAVENOUS; SOFT TISSUE
Status: COMPLETED | OUTPATIENT
Start: 2023-08-25 | End: 2023-08-25

## 2023-08-25 RX ORDER — METHYLPREDNISOLONE ACETATE 40 MG/ML
40 INJECTION, SUSPENSION INTRA-ARTICULAR; INTRALESIONAL; INTRAMUSCULAR; SOFT TISSUE
Status: COMPLETED | OUTPATIENT
Start: 2023-08-25 | End: 2023-08-25

## 2023-08-25 RX ORDER — AZITHROMYCIN 250 MG/1
TABLET, FILM COATED ORAL
Qty: 6 TABLET | Refills: 0 | Status: SHIPPED | OUTPATIENT
Start: 2023-08-25 | End: 2023-08-30

## 2023-08-25 RX ADMIN — CEFTRIAXONE 1 G: 1 INJECTION, POWDER, FOR SOLUTION INTRAMUSCULAR; INTRAVENOUS at 09:08

## 2023-08-25 RX ADMIN — METHYLPREDNISOLONE ACETATE 40 MG: 40 INJECTION, SUSPENSION INTRA-ARTICULAR; INTRALESIONAL; INTRAMUSCULAR; SOFT TISSUE at 09:08

## 2023-08-25 RX ADMIN — DEXAMETHASONE SODIUM PHOSPHATE 4 MG: 4 INJECTION, SOLUTION INTRA-ARTICULAR; INTRALESIONAL; INTRAMUSCULAR; INTRAVENOUS; SOFT TISSUE at 09:08

## 2023-08-25 NOTE — PATIENT INSTRUCTIONS
COVID-19 negative  Influenza A/B negative    IM injections today as ordered along with PO antibiotic as prescribed  Encouraged increased PO fluids    Follow up if symptoms persist/worsen  Declines need for work excuse

## 2023-08-25 NOTE — PROGRESS NOTES
"Subjective:       Patient ID: Sophy Orozco is a 33 y.o. female.    Chief Complaint: Sinus Problem and Cough (Sick for 2 days, feels like sinusitis, worse in the afternoon)    HPI    Patient presents c/o cough, congestion, intermittent left ear pain x 6 days  Cough productive with small amount of thick, green phlegm.  Symptoms seem to worsen as the day progresses    BP (!) 153/84   Pulse 61   Ht 5' 11" (1.803 m)   Wt 114.8 kg (253 lb)   LMP 08/25/2021 (Exact Date)   BMI 35.29 kg/m²     Medication List with Changes/Refills   New Medications    AZITHROMYCIN (Z-ADDIE) 250 MG TABLET    Take 2 tablets by mouth on day 1; Take 1 tablet by mouth on days 2-5   Current Medications    ATORVASTATIN (LIPITOR) 10 MG TABLET    Take 1 tablet (10 mg total) by mouth once daily.    ESTROGENS, CONJUGATED, (PREMARIN) 0.625 MG TABLET    Take 1 tablet (0.625 mg total) by mouth once daily.    HYDROCHLOROTHIAZIDE (HYDRODIURIL) 12.5 MG TAB    Take 1 tablet (12.5 mg total) by mouth once daily.    LISINOPRIL (PRINIVIL,ZESTRIL) 20 MG TABLET    Take 1 tablet (20 mg total) by mouth once daily.    METFORMIN (GLUCOPHAGE) 500 MG TABLET    Take 1 tablet (500 mg total) by mouth 2 (two) times daily with meals.    OMEPRAZOLE (PRILOSEC) 20 MG CAPSULE    Take 1 capsule (20 mg total) by mouth once daily.    ONDANSETRON (ZOFRAN-ODT) 8 MG TBDL    Take 1 tablet (8 mg total) by mouth every 12 (twelve) hours as needed (nausea).    ULTICARE PEN NEEDLE 32 GAUGE X 1/4" NDLE    USE EVERY DAY AS DIRECTED       Review of Systems   Constitutional:  Positive for fatigue. Negative for chills and fever.   HENT:  Positive for ear pain (left), postnasal drip, sinus pressure/congestion and sneezing.    Respiratory:  Positive for cough. Negative for shortness of breath and wheezing.    Cardiovascular:  Negative for chest pain.   Gastrointestinal:  Negative for abdominal pain.         Objective:      Physical Exam  Vitals and nursing note reviewed. "   Constitutional:       General: She is not in acute distress.     Appearance: Normal appearance.   HENT:      Head: Normocephalic.      Right Ear: Hearing, ear canal and external ear normal. A middle ear effusion is present.      Left Ear: Hearing, ear canal and external ear normal. A middle ear effusion is present.      Ears:      Comments: Right ear effusion appears opaque/yellow  Eyes:      Conjunctiva/sclera: Conjunctivae normal.   Neck:      Thyroid: No thyromegaly.      Trachea: Trachea normal.   Cardiovascular:      Rate and Rhythm: Normal rate and regular rhythm.      Pulses: Normal pulses.      Heart sounds: Normal heart sounds.   Pulmonary:      Effort: Pulmonary effort is normal. No respiratory distress.      Breath sounds: Normal breath sounds.   Musculoskeletal:      Cervical back: Neck supple.      Right lower leg: No edema.      Left lower leg: No edema.   Lymphadenopathy:      Cervical: No cervical adenopathy.   Skin:     General: Skin is warm and dry.   Neurological:      General: No focal deficit present.      Mental Status: She is alert and oriented to person, place, and time.   Psychiatric:         Mood and Affect: Mood normal.         Behavior: Behavior normal.         Assessment:       1. Acute sinusitis, recurrence not specified, unspecified location    2. Exposure to communicable disease    3. Acute middle ear effusion, bilateral        Plan:       Patient Instructions   COVID-19 negative  Influenza A/B negative    IM injections today as ordered along with PO antibiotic as prescribed  Encouraged increased PO fluids    Follow up if symptoms persist/worsen  Declines need for work excuse   Acute sinusitis, recurrence not specified, unspecified location  -     cefTRIAXone injection 1 g  -     dexAMETHasone injection 4 mg  -     methylPREDNISolone acetate injection 40 mg  -     azithromycin (Z-ADDIE) 250 MG tablet; Take 2 tablets by mouth on day 1; Take 1 tablet by mouth on days 2-5  Dispense: 6  tablet; Refill: 0    Exposure to communicable disease  -     POCT SARS-COV2 (COVID) with Flu Antigen    Acute middle ear effusion, bilateral  -     cefTRIAXone injection 1 g  -     dexAMETHasone injection 4 mg  -     methylPREDNISolone acetate injection 40 mg

## 2023-10-17 ENCOUNTER — OFFICE VISIT (OUTPATIENT)
Dept: OBSTETRICS AND GYNECOLOGY | Facility: CLINIC | Age: 33
End: 2023-10-17
Payer: COMMERCIAL

## 2023-10-17 VITALS
DIASTOLIC BLOOD PRESSURE: 84 MMHG | WEIGHT: 255.81 LBS | HEART RATE: 52 BPM | BODY MASS INDEX: 35.68 KG/M2 | SYSTOLIC BLOOD PRESSURE: 122 MMHG

## 2023-10-17 DIAGNOSIS — Z01.419 ROUTINE GYNECOLOGICAL EXAMINATION: Primary | ICD-10-CM

## 2023-10-17 DIAGNOSIS — N95.1 MENOPAUSAL SYMPTOM: ICD-10-CM

## 2023-10-17 DIAGNOSIS — R61 NIGHT SWEATS: ICD-10-CM

## 2023-10-17 DIAGNOSIS — Z11.4 ENCOUNTER FOR SCREENING FOR HIV: ICD-10-CM

## 2023-10-17 DIAGNOSIS — Z12.72 SPECIAL SCREENING FOR MALIGNANT NEOPLASMS, VAGINA: ICD-10-CM

## 2023-10-17 DIAGNOSIS — Z72.51 HIGH RISK SEXUAL BEHAVIOR, UNSPECIFIED TYPE: ICD-10-CM

## 2023-10-17 DIAGNOSIS — Z11.3 SCREEN FOR STD (SEXUALLY TRANSMITTED DISEASE): ICD-10-CM

## 2023-10-17 DIAGNOSIS — Z79.890 HORMONE REPLACEMENT THERAPY: ICD-10-CM

## 2023-10-17 PROCEDURE — 88142 CYTOPATH C/V THIN LAYER: CPT | Mod: TC,GCY | Performed by: OBSTETRICS & GYNECOLOGY

## 2023-10-17 PROCEDURE — 3079F DIAST BP 80-89 MM HG: CPT | Mod: ,,, | Performed by: OBSTETRICS & GYNECOLOGY

## 2023-10-17 PROCEDURE — 1159F PR MEDICATION LIST DOCUMENTED IN MEDICAL RECORD: ICD-10-PCS | Mod: ,,, | Performed by: OBSTETRICS & GYNECOLOGY

## 2023-10-17 PROCEDURE — 3044F PR MOST RECENT HEMOGLOBIN A1C LEVEL <7.0%: ICD-10-PCS | Mod: ,,, | Performed by: OBSTETRICS & GYNECOLOGY

## 2023-10-17 PROCEDURE — 87624 HUMAN PAPILLOMAVIRUS (HPV): ICD-10-PCS | Mod: ,,, | Performed by: CLINICAL MEDICAL LABORATORY

## 2023-10-17 PROCEDURE — 3074F SYST BP LT 130 MM HG: CPT | Mod: ,,, | Performed by: OBSTETRICS & GYNECOLOGY

## 2023-10-17 PROCEDURE — 87624 HPV HI-RISK TYP POOLED RSLT: CPT | Mod: ,,, | Performed by: CLINICAL MEDICAL LABORATORY

## 2023-10-17 PROCEDURE — 3008F BODY MASS INDEX DOCD: CPT | Mod: ,,, | Performed by: OBSTETRICS & GYNECOLOGY

## 2023-10-17 PROCEDURE — 3008F PR BODY MASS INDEX (BMI) DOCUMENTED: ICD-10-PCS | Mod: ,,, | Performed by: OBSTETRICS & GYNECOLOGY

## 2023-10-17 PROCEDURE — 3074F PR MOST RECENT SYSTOLIC BLOOD PRESSURE < 130 MM HG: ICD-10-PCS | Mod: ,,, | Performed by: OBSTETRICS & GYNECOLOGY

## 2023-10-17 PROCEDURE — 99395 PREV VISIT EST AGE 18-39: CPT | Mod: ,,, | Performed by: OBSTETRICS & GYNECOLOGY

## 2023-10-17 PROCEDURE — 99395 PR PREVENTIVE VISIT,EST,18-39: ICD-10-PCS | Mod: ,,, | Performed by: OBSTETRICS & GYNECOLOGY

## 2023-10-17 PROCEDURE — 3044F HG A1C LEVEL LT 7.0%: CPT | Mod: ,,, | Performed by: OBSTETRICS & GYNECOLOGY

## 2023-10-17 PROCEDURE — 4010F PR ACE/ARB THEARPY RXD/TAKEN: ICD-10-PCS | Mod: ,,, | Performed by: OBSTETRICS & GYNECOLOGY

## 2023-10-17 PROCEDURE — 1159F MED LIST DOCD IN RCRD: CPT | Mod: ,,, | Performed by: OBSTETRICS & GYNECOLOGY

## 2023-10-17 PROCEDURE — 3079F PR MOST RECENT DIASTOLIC BLOOD PRESSURE 80-89 MM HG: ICD-10-PCS | Mod: ,,, | Performed by: OBSTETRICS & GYNECOLOGY

## 2023-10-17 PROCEDURE — 4010F ACE/ARB THERAPY RXD/TAKEN: CPT | Mod: ,,, | Performed by: OBSTETRICS & GYNECOLOGY

## 2023-10-17 RX ORDER — ESCITALOPRAM OXALATE 20 MG/1
TABLET ORAL
COMMUNITY

## 2023-10-17 NOTE — PROGRESS NOTES
CC: Well woman exam    Sophy Orozco is a 33 y.o. female  presents for well woman exam.    LMP: Patient's last menstrual period was 2021 (exact date)..    Last mammogram: N/A  Last Colonoscopy: N/A    Pt complains of hot flashes 3-4 times a day every day Pt states she has been without her medication for about 3 weeks.     Past Medical History:   Diagnosis Date    Anxiety     Depression     Depression 10/11/2021    Essential hypertension 10/11/2021    Gastroesophageal reflux disease 10/11/2021    History of ovarian cyst     Hyperlipidemia 2023    Hypertension     Prediabetes 2023     Past Surgical History:   Procedure Laterality Date     SECTION       x 1    CHOLECYSTECTOMY      LAPAROSCOPIC SALPINGO-OOPHORECTOMY Bilateral 2021    Procedure: SALPINGO-OOPHORECTOMY, LAPAROSCOPIC;  Surgeon: Bryce Juares MD;  Location: [facility];  Service: OB/GYN;  Laterality: Bilateral;    LAPAROSCOPIC TOTAL HYSTERECTOMY Bilateral 2021    Procedure: HYSTERECTOMY, TOTAL VAGINAL LAPAROSCOPIC;  Surgeon: Bryce Juares MD;  Location: [facility];  Service: OB/GYN;  Laterality: Bilateral;  vaginal - vpath     TUBAL LIGATION  2014     Social History     Socioeconomic History    Marital status:    Tobacco Use    Smoking status: Former     Types: Cigars, Cigarettes     Passive exposure: Current    Smokeless tobacco: Never    Tobacco comments:     Quit smoking 2 months ago.   Substance and Sexual Activity    Alcohol use: Yes     Comment: Social    Drug use: Yes     Types: Marijuana    Sexual activity: Yes     Partners: Male     Birth control/protection: See Surgical Hx     Family History   Problem Relation Age of Onset    Ovarian cancer Maternal Grandmother     Hypertension Father     Diabetes Father     Heart disease Father     Atrial fibrillation Father     Diabetes Mother     Hypertension Mother     Heart disease Mother     Breast cancer Son     Breast cancer  Son      OB History          2    Para   2    Term   2            AB        Living   2         SAB        IAB        Ectopic        Multiple        Live Births   2                 /84   Pulse (!) 52   Wt 116 kg (255 lb 12.8 oz)   LMP 2021 (Exact Date)   BMI 35.68 kg/m²       ROS:  GENERAL: Denies weight gain or weight loss. Feeling well overall.   SKIN: Denies rash or lesions.   HEAD: Denies head injury or headache.   NODES: Denies enlarged lymph nodes.   CHEST: Denies chest pain or shortness of breath.   CARDIOVASCULAR: Denies palpitations or left sided chest pain.   ABDOMEN: No abdominal pain, constipation, diarrhea, nausea, vomiting or rectal bleeding.   URINARY: No frequency, dysuria, hematuria, or burning on urination.  REPRODUCTIVE: See HPI.   BREASTS: The patient performs breast self-examination and denies pain, lumps, or nipple discharge.   HEMATOLOGIC: No easy bruisability or excessive bleeding.   MUSCULOSKELETAL: Denies joint pain or swelling.   NEUROLOGIC: Denies syncope or weakness.   PSYCHIATRIC: Denies depression, anxiety or mood swings.    PHYSICAL EXAM:  APPEARANCE: Well nourished, well developed, in no acute distress.  AFFECT: WNL, alert and oriented x 3  SKIN: No acne or hirsutism  NECK: Neck symmetric without masses or thyromegaly  NODES: No inguinal, cervical, axillary, or femoral lymph node enlargement  CHEST: Good respiratory effect  ABDOMEN: Soft.  No tenderness or masses.  No hepatosplenomegaly.  No hernias.  BREASTS: Symmetrical, no skin changes or visible lesions.  No palpable masses, nipple discharge bilaterally.  PELVIC: Normal external genitalia without lesions.  Normal hair distribution.  Adequate perineal body, normal urethral meatus.  Vagina moist and well rugated without lesions or discharge.  Cervix  and uterus surgically absent. Adnexa without masses or tenderness.    EXTREMITIES: No edema.    Routine gynecological examination  -     ThinPrep Pap Test;  Future; Expected date: 10/17/2023    Screen for STD (sexually transmitted disease)    High risk sexual behavior, unspecified type    Encounter for screening for HIV    Special screening for malignant neoplasms, vagina  -     ThinPrep Pap Test; Future; Expected date: 10/17/2023    Menopausal symptom  -     estrogens, conjugated, (PREMARIN) 0.625 MG tablet; Take 1 tablet (0.625 mg total) by mouth once daily.  Dispense: 30 tablet; Refill: 11    Hormone replacement therapy  -     estrogens, conjugated, (PREMARIN) 0.625 MG tablet; Take 1 tablet (0.625 mg total) by mouth once daily.  Dispense: 30 tablet; Refill: 11    Night sweats  -     estrogens, conjugated, (PREMARIN) 0.625 MG tablet; Take 1 tablet (0.625 mg total) by mouth once daily.  Dispense: 30 tablet; Refill: 11            Patient was counseled today on A.C.S. Pap guidelines and recommendations for yearly pelvic exams, mammograms and monthly self breast exams; to see her PCP for other health maintenance.   Exercise regimen encouraged  Healthy food choices encouraged  Questions answered to desired level of satisfaction  Verbalized understanding to all information and instructions    Follow up in about 1 year (around 10/17/2024) for Annual.      Bryce Juares M.D., FCOG    OB/GYN

## 2023-10-18 ENCOUNTER — OFFICE VISIT (OUTPATIENT)
Dept: FAMILY MEDICINE | Facility: CLINIC | Age: 33
End: 2023-10-18
Payer: COMMERCIAL

## 2023-10-18 VITALS
HEART RATE: 88 BPM | RESPIRATION RATE: 13 BRPM | WEIGHT: 254.19 LBS | SYSTOLIC BLOOD PRESSURE: 128 MMHG | HEIGHT: 71 IN | OXYGEN SATURATION: 98 % | BODY MASS INDEX: 35.59 KG/M2 | DIASTOLIC BLOOD PRESSURE: 93 MMHG

## 2023-10-18 DIAGNOSIS — K21.9 GASTROESOPHAGEAL REFLUX DISEASE, UNSPECIFIED WHETHER ESOPHAGITIS PRESENT: ICD-10-CM

## 2023-10-18 DIAGNOSIS — R53.83 FATIGUE, UNSPECIFIED TYPE: ICD-10-CM

## 2023-10-18 DIAGNOSIS — I10 ESSENTIAL HYPERTENSION: ICD-10-CM

## 2023-10-18 DIAGNOSIS — E78.5 HYPERLIPIDEMIA, UNSPECIFIED HYPERLIPIDEMIA TYPE: ICD-10-CM

## 2023-10-18 DIAGNOSIS — R73.03 PREDIABETES: Primary | ICD-10-CM

## 2023-10-18 LAB
GH SERPL-MCNC: NORMAL NG/ML
INSULIN SERPL-ACNC: NORMAL U[IU]/ML
LAB AP CLINICAL INFORMATION: NORMAL
LAB AP GYN INTERPRETATION: NEGATIVE
LAB AP PAP DISCLAIMER COMMENTS: NORMAL
RENIN PLAS-CCNC: NORMAL NG/ML/H

## 2023-10-18 PROCEDURE — 3074F PR MOST RECENT SYSTOLIC BLOOD PRESSURE < 130 MM HG: ICD-10-PCS | Mod: ,,, | Performed by: NURSE PRACTITIONER

## 2023-10-18 PROCEDURE — 4010F ACE/ARB THERAPY RXD/TAKEN: CPT | Mod: ,,, | Performed by: NURSE PRACTITIONER

## 2023-10-18 PROCEDURE — 80053 COMPREHENSIVE METABOLIC PANEL: ICD-10-PCS | Mod: ,,, | Performed by: CLINICAL MEDICAL LABORATORY

## 2023-10-18 PROCEDURE — 3080F DIAST BP >= 90 MM HG: CPT | Mod: ,,, | Performed by: NURSE PRACTITIONER

## 2023-10-18 PROCEDURE — 85025 CBC WITH DIFFERENTIAL: ICD-10-PCS | Mod: ,,, | Performed by: CLINICAL MEDICAL LABORATORY

## 2023-10-18 PROCEDURE — 4010F PR ACE/ARB THEARPY RXD/TAKEN: ICD-10-PCS | Mod: ,,, | Performed by: NURSE PRACTITIONER

## 2023-10-18 PROCEDURE — 83036 HEMOGLOBIN GLYCOSYLATED A1C: CPT | Mod: ,,, | Performed by: CLINICAL MEDICAL LABORATORY

## 2023-10-18 PROCEDURE — 99214 OFFICE O/P EST MOD 30 MIN: CPT | Mod: ,,, | Performed by: NURSE PRACTITIONER

## 2023-10-18 PROCEDURE — 85025 COMPLETE CBC W/AUTO DIFF WBC: CPT | Mod: ,,, | Performed by: CLINICAL MEDICAL LABORATORY

## 2023-10-18 PROCEDURE — 1159F PR MEDICATION LIST DOCUMENTED IN MEDICAL RECORD: ICD-10-PCS | Mod: ,,, | Performed by: NURSE PRACTITIONER

## 2023-10-18 PROCEDURE — 1160F RVW MEDS BY RX/DR IN RCRD: CPT | Mod: ,,, | Performed by: NURSE PRACTITIONER

## 2023-10-18 PROCEDURE — 83036 HEMOGLOBIN A1C: ICD-10-PCS | Mod: ,,, | Performed by: CLINICAL MEDICAL LABORATORY

## 2023-10-18 PROCEDURE — 99214 PR OFFICE/OUTPT VISIT, EST, LEVL IV, 30-39 MIN: ICD-10-PCS | Mod: ,,, | Performed by: NURSE PRACTITIONER

## 2023-10-18 PROCEDURE — 82607 VITAMIN B-12: CPT | Mod: ,,, | Performed by: CLINICAL MEDICAL LABORATORY

## 2023-10-18 PROCEDURE — 82306 VITAMIN D 25 HYDROXY: CPT | Mod: ,,, | Performed by: CLINICAL MEDICAL LABORATORY

## 2023-10-18 PROCEDURE — 3008F PR BODY MASS INDEX (BMI) DOCUMENTED: ICD-10-PCS | Mod: ,,, | Performed by: NURSE PRACTITIONER

## 2023-10-18 PROCEDURE — 1160F PR REVIEW ALL MEDS BY PRESCRIBER/CLIN PHARMACIST DOCUMENTED: ICD-10-PCS | Mod: ,,, | Performed by: NURSE PRACTITIONER

## 2023-10-18 PROCEDURE — 3044F HG A1C LEVEL LT 7.0%: CPT | Mod: ,,, | Performed by: NURSE PRACTITIONER

## 2023-10-18 PROCEDURE — 3074F SYST BP LT 130 MM HG: CPT | Mod: ,,, | Performed by: NURSE PRACTITIONER

## 2023-10-18 PROCEDURE — 3044F PR MOST RECENT HEMOGLOBIN A1C LEVEL <7.0%: ICD-10-PCS | Mod: ,,, | Performed by: NURSE PRACTITIONER

## 2023-10-18 PROCEDURE — 1159F MED LIST DOCD IN RCRD: CPT | Mod: ,,, | Performed by: NURSE PRACTITIONER

## 2023-10-18 PROCEDURE — 82607 VITAMIN B12: ICD-10-PCS | Mod: ,,, | Performed by: CLINICAL MEDICAL LABORATORY

## 2023-10-18 PROCEDURE — 80053 COMPREHEN METABOLIC PANEL: CPT | Mod: ,,, | Performed by: CLINICAL MEDICAL LABORATORY

## 2023-10-18 PROCEDURE — 80061 LIPID PANEL: CPT | Mod: ,,, | Performed by: CLINICAL MEDICAL LABORATORY

## 2023-10-18 PROCEDURE — 3008F BODY MASS INDEX DOCD: CPT | Mod: ,,, | Performed by: NURSE PRACTITIONER

## 2023-10-18 PROCEDURE — 80061 LIPID PANEL: ICD-10-PCS | Mod: ,,, | Performed by: CLINICAL MEDICAL LABORATORY

## 2023-10-18 PROCEDURE — 3080F PR MOST RECENT DIASTOLIC BLOOD PRESSURE >= 90 MM HG: ICD-10-PCS | Mod: ,,, | Performed by: NURSE PRACTITIONER

## 2023-10-18 PROCEDURE — 82306 VITAMIN D: ICD-10-PCS | Mod: ,,, | Performed by: CLINICAL MEDICAL LABORATORY

## 2023-10-18 RX ORDER — LISINOPRIL 20 MG/1
20 TABLET ORAL DAILY
Qty: 90 TABLET | Refills: 1 | Status: SHIPPED | OUTPATIENT
Start: 2023-10-18 | End: 2023-10-24 | Stop reason: SDUPTHER

## 2023-10-18 RX ORDER — HYDROCHLOROTHIAZIDE 12.5 MG/1
12.5 TABLET ORAL DAILY
Qty: 90 TABLET | Refills: 1 | Status: SHIPPED | OUTPATIENT
Start: 2023-10-18 | End: 2023-10-24 | Stop reason: SDUPTHER

## 2023-10-18 RX ORDER — METFORMIN HYDROCHLORIDE 500 MG/1
500 TABLET ORAL 2 TIMES DAILY WITH MEALS
Qty: 180 TABLET | Refills: 1 | Status: SHIPPED | OUTPATIENT
Start: 2023-10-18 | End: 2023-10-24 | Stop reason: SDUPTHER

## 2023-10-18 RX ORDER — OMEPRAZOLE 20 MG/1
20 CAPSULE, DELAYED RELEASE ORAL DAILY
Qty: 90 CAPSULE | Refills: 1 | Status: SHIPPED | OUTPATIENT
Start: 2023-10-18 | End: 2023-10-24 | Stop reason: SDUPTHER

## 2023-10-18 RX ORDER — ATORVASTATIN CALCIUM 10 MG/1
10 TABLET, FILM COATED ORAL DAILY
Qty: 90 TABLET | Refills: 1 | Status: SHIPPED | OUTPATIENT
Start: 2023-10-18 | End: 2023-10-24 | Stop reason: SDUPTHER

## 2023-10-18 NOTE — PATIENT INSTRUCTIONS
Lab obtained in clinic today, we will notify you of results and any necessary changes to plan of care   Refills on routine medications   Follow up on  04/10/2024 and as needed; routine medication will be due 04/16/2024    Check blood pressure in the am and again in the afternoon, keep written log and bring in to clinic  in two weeks for me to review

## 2023-10-18 NOTE — PROGRESS NOTES
Clinic note     Patient name: Sophy Orozco is a 33 y.o. female   Chief compliant   Chief Complaint   Patient presents with    Diabetes    Medication Refill    Hypertension       Subjective     History of present illness   In clinic for routine follow up and medication refills   Denies any acute concerns at present     Past Medical History: prediabetes, HTN, HLD, GERD, depression, anxiety     Last A1c 6.4, this will be rechecked today     Lab ordered on 8/3/2023 was not obtained, will obtain lab while in clinic today     Blood pressure elevated in clinic today, she has been out of medication for about three weeks, she states at gyn appointment last month bp reading was 120/84; denies any chest pain, shortness of breath, dizziness, palpitations, headache         Social History     Tobacco Use    Smoking status: Former     Types: Cigars, Cigarettes     Passive exposure: Current    Smokeless tobacco: Never    Tobacco comments:     Quit smoking 2 months ago.   Substance Use Topics    Alcohol use: Yes     Comment: Social    Drug use: Yes     Types: Marijuana       Review of patient's allergies indicates:   Allergen Reactions    Clindamycin Hives and Rash     swelling       Past Medical History:   Diagnosis Date    Anxiety     Depression     Depression 10/11/2021    Essential hypertension 10/11/2021    Gastroesophageal reflux disease 10/11/2021    History of ovarian cyst     Hyperlipidemia 2023    Hypertension     Prediabetes 2023       Past Surgical History:   Procedure Laterality Date     SECTION       x 1    CHOLECYSTECTOMY      LAPAROSCOPIC SALPINGO-OOPHORECTOMY Bilateral 2021    Procedure: SALPINGO-OOPHORECTOMY, LAPAROSCOPIC;  Surgeon: Bryce Juares MD;  Location: [facility];  Service: OB/GYN;  Laterality: Bilateral;    LAPAROSCOPIC TOTAL HYSTERECTOMY Bilateral 2021    Procedure: HYSTERECTOMY, TOTAL VAGINAL LAPAROSCOPIC;  Surgeon: Bryce Juares MD;  Location:  [facility] OR;  Service: OB/GYN;  Laterality: Bilateral;  vaginal - vpath     TUBAL LIGATION  12/2014        Family History   Problem Relation Age of Onset    Ovarian cancer Maternal Grandmother     Hypertension Father     Diabetes Father     Heart disease Father     Atrial fibrillation Father     Diabetes Mother     Hypertension Mother     Heart disease Mother     Breast cancer Son     Breast cancer Son          Current Outpatient Medications:     atorvastatin (LIPITOR) 10 MG tablet, Take 1 tablet (10 mg total) by mouth once daily., Disp: 90 tablet, Rfl: 1    EScitalopram oxalate (LEXAPRO) 20 MG tablet, 1 tablet Orally Once a day for 30 day(s), Disp: , Rfl:     estrogens, conjugated, (PREMARIN) 0.625 MG tablet, Take 1 tablet (0.625 mg total) by mouth once daily., Disp: 30 tablet, Rfl: 11    hydroCHLOROthiazide (HYDRODIURIL) 12.5 MG Tab, Take 1 tablet (12.5 mg total) by mouth once daily., Disp: 90 tablet, Rfl: 1    lisinopriL (PRINIVIL,ZESTRIL) 20 MG tablet, Take 1 tablet (20 mg total) by mouth once daily., Disp: 90 tablet, Rfl: 1    metFORMIN (GLUCOPHAGE) 500 MG tablet, Take 1 tablet (500 mg total) by mouth 2 (two) times daily with meals., Disp: 180 tablet, Rfl: 1    omeprazole (PRILOSEC) 20 MG capsule, Take 1 capsule (20 mg total) by mouth once daily., Disp: 90 capsule, Rfl: 1    Review of Systems   Constitutional:  Negative for appetite change, chills, fatigue, fever and unexpected weight change.   Respiratory:  Negative for cough and shortness of breath.    Cardiovascular:  Negative for chest pain, palpitations and leg swelling.   Gastrointestinal:  Negative for abdominal pain, change in bowel habit, constipation, diarrhea, nausea and vomiting.   Genitourinary:  Negative for dysuria and frequency.   Musculoskeletal:  Negative for arthralgias, gait problem and myalgias.   Neurological:  Negative for dizziness, syncope, light-headedness and headaches.   Psychiatric/Behavioral:  Negative for dysphoric mood and sleep  "disturbance. The patient is not nervous/anxious.        Objective     BP (!) 128/93   Pulse 88   Resp 13   Ht 5' 11" (1.803 m)   Wt 115.3 kg (254 lb 3.2 oz)   LMP 08/25/2021 (Exact Date)   SpO2 98%   BMI 35.45 kg/m²     Physical Exam   Constitutional: She is oriented to person, place, and time. No distress.   HENT:   Head: Atraumatic.   Mouth/Throat: Mucous membranes are moist.   Cardiovascular: Normal rate and regular rhythm. Pulmonary:      Effort: Pulmonary effort is normal. No respiratory distress.      Breath sounds: Normal breath sounds. No wheezing, rhonchi or rales.     Abdominal: Soft. Bowel sounds are normal. She exhibits no distension. There is no abdominal tenderness.   Musculoskeletal:         General: Normal range of motion.      Cervical back: Neck supple.      Right lower leg: No edema.      Left lower leg: No edema.   Neurological: She is alert and oriented to person, place, and time. Gait normal.   Skin: Skin is warm and dry.   Psychiatric: Her behavior is normal. Mood normal.       Lab Results   Component Value Date    WBC 7.67 10/26/2022    HGB 14.4 10/26/2022    HCT 44.6 10/26/2022    MCV 83.1 10/26/2022     10/26/2022       CMP  Sodium   Date Value Ref Range Status   10/26/2022 141 136 - 145 mmol/L Final     Potassium   Date Value Ref Range Status   10/26/2022 4.0 3.5 - 5.1 mmol/L Final     Chloride   Date Value Ref Range Status   10/26/2022 107 98 - 107 mmol/L Final     CO2   Date Value Ref Range Status   10/26/2022 26 21 - 32 mmol/L Final     Glucose   Date Value Ref Range Status   10/26/2022 110 (H) 74 - 106 mg/dL Final     BUN   Date Value Ref Range Status   10/26/2022 12 7 - 18 mg/dL Final     Creatinine   Date Value Ref Range Status   10/26/2022 0.92 0.55 - 1.02 mg/dL Final     Calcium   Date Value Ref Range Status   10/26/2022 9.0 8.5 - 10.1 mg/dL Final     Total Protein   Date Value Ref Range Status   10/26/2022 7.2 6.4 - 8.2 g/dL Final     Albumin   Date Value Ref Range " Status   10/26/2022 3.6 3.5 - 5.0 g/dL Final     Bilirubin, Total   Date Value Ref Range Status   10/26/2022 0.4 >0.0 - 1.2 mg/dL Final     Alk Phos   Date Value Ref Range Status   10/26/2022 99 (H) 37 - 98 U/L Final     AST   Date Value Ref Range Status   10/26/2022 12 (L) 15 - 37 U/L Final     ALT   Date Value Ref Range Status   10/26/2022 22 13 - 56 U/L Final     Anion Gap   Date Value Ref Range Status   10/26/2022 12 7 - 16 mmol/L Final     eGFR   Date Value Ref Range Status   10/11/2021 71 >=60 mL/min/1.73m² Final     Lab Results   Component Value Date    TSH 2.180 10/26/2022     Lab Results   Component Value Date    CHOL 189 10/26/2022    CHOL 158 10/11/2021     Lab Results   Component Value Date    HDL 35 (L) 10/26/2022    HDL 31 (L) 10/11/2021     Lab Results   Component Value Date    LDLCALC 128 10/26/2022    LDLCALC 106 10/11/2021     Lab Results   Component Value Date    TRIG 130 10/26/2022    TRIG 104 10/11/2021     Lab Results   Component Value Date    CHOLHDL 5.4 10/26/2022    CHOLHDL 5.1 10/11/2021     Lab Results   Component Value Date    HGBA1C 6.4 02/09/2023         Assessment and Plan   Prediabetes  -     metFORMIN (GLUCOPHAGE) 500 MG tablet; Take 1 tablet (500 mg total) by mouth 2 (two) times daily with meals.  Dispense: 180 tablet; Refill: 1  -     Hemoglobin A1C    Essential hypertension  -     lisinopriL (PRINIVIL,ZESTRIL) 20 MG tablet; Take 1 tablet (20 mg total) by mouth once daily.  Dispense: 90 tablet; Refill: 1  -     hydroCHLOROthiazide (HYDRODIURIL) 12.5 MG Tab; Take 1 tablet (12.5 mg total) by mouth once daily.  Dispense: 90 tablet; Refill: 1  -     CBC Auto Differential  -     Comprehensive Metabolic Panel    Hyperlipidemia, unspecified hyperlipidemia type  -     atorvastatin (LIPITOR) 10 MG tablet; Take 1 tablet (10 mg total) by mouth once daily.  Dispense: 90 tablet; Refill: 1  -     Lipid Panel    Gastroesophageal reflux disease, unspecified whether esophagitis present  -      omeprazole (PRILOSEC) 20 MG capsule; Take 1 capsule (20 mg total) by mouth once daily.  Dispense: 90 capsule; Refill: 1    BMI 35.0-35.9,adult    Fatigue, unspecified type  -     Vitamin B12  -     Vitamin D          Patient Instructions  Patient Instructions   Lab obtained in clinic today, we will notify you of results and any necessary changes to plan of care   Refills on routine medications   Follow up on  04/10/2024 and as needed; routine medication will be due 04/16/2024    Check blood pressure in the am and again in the afternoon, keep written log and bring in to clinic  in two weeks for me to review

## 2023-10-19 ENCOUNTER — TELEPHONE (OUTPATIENT)
Dept: OBSTETRICS AND GYNECOLOGY | Facility: CLINIC | Age: 33
End: 2023-10-19
Payer: COMMERCIAL

## 2023-10-19 LAB
25(OH)D3 SERPL-MCNC: 37.6 NG/ML
ALBUMIN SERPL BCP-MCNC: 3.3 G/DL (ref 3.5–5)
ALBUMIN/GLOB SERPL: 0.8 {RATIO}
ALP SERPL-CCNC: 98 U/L (ref 37–98)
ALT SERPL W P-5'-P-CCNC: 26 U/L (ref 13–56)
ANION GAP SERPL CALCULATED.3IONS-SCNC: 12 MMOL/L (ref 7–16)
AST SERPL W P-5'-P-CCNC: 10 U/L (ref 15–37)
BASOPHILS # BLD AUTO: 0.04 K/UL (ref 0–0.2)
BASOPHILS NFR BLD AUTO: 0.5 % (ref 0–1)
BILIRUB SERPL-MCNC: 0.2 MG/DL (ref ?–1.2)
BUN SERPL-MCNC: 10 MG/DL (ref 7–18)
BUN/CREAT SERPL: 10 (ref 6–20)
CALCIUM SERPL-MCNC: 8.4 MG/DL (ref 8.5–10.1)
CHLORIDE SERPL-SCNC: 110 MMOL/L (ref 98–107)
CHOLEST SERPL-MCNC: 180 MG/DL (ref 0–200)
CHOLEST/HDLC SERPL: 3.8 {RATIO}
CO2 SERPL-SCNC: 24 MMOL/L (ref 21–32)
CREAT SERPL-MCNC: 0.96 MG/DL (ref 0.55–1.02)
DIFFERENTIAL METHOD BLD: NORMAL
EGFR (NO RACE VARIABLE) (RUSH/TITUS): 79 ML/MIN/1.73M2
EOSINOPHIL # BLD AUTO: 0.09 K/UL (ref 0–0.5)
EOSINOPHIL NFR BLD AUTO: 1.1 % (ref 1–4)
ERYTHROCYTE [DISTWIDTH] IN BLOOD BY AUTOMATED COUNT: 13.4 % (ref 11.5–14.5)
EST. AVERAGE GLUCOSE BLD GHB EST-MCNC: 117 MG/DL
GLOBULIN SER-MCNC: 4 G/DL (ref 2–4)
GLUCOSE SERPL-MCNC: 98 MG/DL (ref 74–106)
HBA1C MFR BLD HPLC: 6.1 % (ref 4.5–6.6)
HCT VFR BLD AUTO: 40.6 % (ref 38–47)
HDLC SERPL-MCNC: 48 MG/DL (ref 40–60)
HGB BLD-MCNC: 13.4 G/DL (ref 12–16)
HPV 16: NEGATIVE
HPV 18: NEGATIVE
HPV OTHER: NEGATIVE
IMM GRANULOCYTES # BLD AUTO: 0.03 K/UL (ref 0–0.04)
IMM GRANULOCYTES NFR BLD: 0.4 % (ref 0–0.4)
LDLC SERPL CALC-MCNC: 117 MG/DL
LDLC/HDLC SERPL: 2.4 {RATIO}
LYMPHOCYTES # BLD AUTO: 2.59 K/UL (ref 1–4.8)
LYMPHOCYTES NFR BLD AUTO: 30.7 % (ref 27–41)
MCH RBC QN AUTO: 28 PG (ref 27–31)
MCHC RBC AUTO-ENTMCNC: 33 G/DL (ref 32–36)
MCV RBC AUTO: 84.8 FL (ref 80–96)
MONOCYTES # BLD AUTO: 0.5 K/UL (ref 0–0.8)
MONOCYTES NFR BLD AUTO: 5.9 % (ref 2–6)
MPC BLD CALC-MCNC: 11.7 FL (ref 9.4–12.4)
NEUTROPHILS # BLD AUTO: 5.2 K/UL (ref 1.8–7.7)
NEUTROPHILS NFR BLD AUTO: 61.4 % (ref 53–65)
NONHDLC SERPL-MCNC: 132 MG/DL
NRBC # BLD AUTO: 0 X10E3/UL
NRBC, AUTO (.00): 0 %
PLATELET # BLD AUTO: 306 K/UL (ref 150–400)
POTASSIUM SERPL-SCNC: 3.5 MMOL/L (ref 3.5–5.1)
PROT SERPL-MCNC: 7.3 G/DL (ref 6.4–8.2)
RBC # BLD AUTO: 4.79 M/UL (ref 4.2–5.4)
SODIUM SERPL-SCNC: 142 MMOL/L (ref 136–145)
TRIGL SERPL-MCNC: 75 MG/DL (ref 35–150)
VIT B12 SERPL-MCNC: 299 PG/ML (ref 193–986)
VLDLC SERPL-MCNC: 15 MG/DL
WBC # BLD AUTO: 8.45 K/UL (ref 4.5–11)

## 2023-10-19 NOTE — TELEPHONE ENCOUNTER
Called pt/ Pt stated that with her insurance her medication is still $191.00/ Informed pt to switch pharmacy to [facility] and see what the price will be and if her insurance still will not pay to call her insurance and see what they will cover for hormone replacement/ Pt verbalized understanding and stated she will call me back with updated information.

## 2023-10-19 NOTE — TELEPHONE ENCOUNTER
----- Message from Anahi Ness MA sent at 10/17/2023 12:11 PM CDT -----  Pt needs a cheaper hormone pill, the one dr.simon solorzano to her today was $200

## 2023-10-23 DIAGNOSIS — N95.1 MENOPAUSAL SYMPTOM: ICD-10-CM

## 2023-10-23 DIAGNOSIS — R61 NIGHT SWEATS: ICD-10-CM

## 2023-10-23 DIAGNOSIS — Z79.890 HORMONE REPLACEMENT THERAPY: ICD-10-CM

## 2023-10-24 DIAGNOSIS — E78.5 HYPERLIPIDEMIA, UNSPECIFIED HYPERLIPIDEMIA TYPE: ICD-10-CM

## 2023-10-24 DIAGNOSIS — K21.9 GASTROESOPHAGEAL REFLUX DISEASE, UNSPECIFIED WHETHER ESOPHAGITIS PRESENT: ICD-10-CM

## 2023-10-24 DIAGNOSIS — I10 ESSENTIAL HYPERTENSION: ICD-10-CM

## 2023-10-24 DIAGNOSIS — R73.03 PREDIABETES: ICD-10-CM

## 2023-10-24 RX ORDER — METFORMIN HYDROCHLORIDE 500 MG/1
500 TABLET ORAL 2 TIMES DAILY WITH MEALS
Qty: 180 TABLET | Refills: 0 | Status: SHIPPED | OUTPATIENT
Start: 2023-10-24

## 2023-10-24 RX ORDER — HYDROCHLOROTHIAZIDE 12.5 MG/1
12.5 TABLET ORAL DAILY
Qty: 90 TABLET | Refills: 0 | Status: SHIPPED | OUTPATIENT
Start: 2023-10-24

## 2023-10-24 RX ORDER — LISINOPRIL 20 MG/1
20 TABLET ORAL DAILY
Qty: 90 TABLET | Refills: 0 | Status: SHIPPED | OUTPATIENT
Start: 2023-10-24

## 2023-10-24 RX ORDER — ATORVASTATIN CALCIUM 10 MG/1
10 TABLET, FILM COATED ORAL DAILY
Qty: 90 TABLET | Refills: 0 | Status: SHIPPED | OUTPATIENT
Start: 2023-10-24 | End: 2024-01-10

## 2023-10-24 RX ORDER — OMEPRAZOLE 20 MG/1
20 CAPSULE, DELAYED RELEASE ORAL DAILY
Qty: 90 CAPSULE | Refills: 0 | Status: SHIPPED | OUTPATIENT
Start: 2023-10-24

## 2024-01-10 DIAGNOSIS — E78.5 HYPERLIPIDEMIA, UNSPECIFIED HYPERLIPIDEMIA TYPE: ICD-10-CM

## 2024-01-10 RX ORDER — ATORVASTATIN CALCIUM 10 MG/1
10 TABLET, FILM COATED ORAL
Qty: 90 TABLET | Refills: 1 | Status: SHIPPED | OUTPATIENT
Start: 2024-01-10

## 2024-05-08 ENCOUNTER — LAB VISIT (OUTPATIENT)
Dept: PRIMARY CARE CLINIC | Facility: CLINIC | Age: 34
End: 2024-05-08

## 2024-05-08 DIAGNOSIS — Z02.83 ENCOUNTER FOR DRUG SCREENING: Primary | ICD-10-CM

## 2024-05-08 PROCEDURE — 99000 SPECIMEN HANDLING OFFICE-LAB: CPT | Mod: ,,, | Performed by: NURSE PRACTITIONER

## 2024-05-08 NOTE — PROGRESS NOTES
Subjective     Patient ID: Sophy Orozco is a 33 y.o. female.    Chief Complaint: No chief complaint on file.    HPI  Review of Systems       Objective     Physical Exam       Assessment and Plan     1. Encounter for drug screening      Drug testing only           No follow-ups on file.

## 2024-06-04 ENCOUNTER — HOSPITAL ENCOUNTER (EMERGENCY)
Facility: HOSPITAL | Age: 34
Discharge: HOME | End: 2024-06-04

## 2024-06-04 VITALS
SYSTOLIC BLOOD PRESSURE: 163 MMHG | BODY MASS INDEX: 32.2 KG/M2 | WEIGHT: 230 LBS | TEMPERATURE: 99 F | RESPIRATION RATE: 18 BRPM | DIASTOLIC BLOOD PRESSURE: 97 MMHG | OXYGEN SATURATION: 98 % | HEART RATE: 73 BPM | HEIGHT: 71 IN

## 2024-06-04 DIAGNOSIS — M54.42 ACUTE LEFT-SIDED LOW BACK PAIN WITH LEFT-SIDED SCIATICA: Primary | ICD-10-CM

## 2024-06-04 LAB
BILIRUB UR QL STRIP: NEGATIVE
CLARITY UR: NORMAL
COLOR UR: YELLOW
GLUCOSE UR STRIP-MCNC: NEGATIVE MG/DL
KETONES UR STRIP-SCNC: NEGATIVE MG/DL
LEUKOCYTE ESTERASE UR QL STRIP: NEGATIVE
NITRITE UR QL STRIP: NEGATIVE
PH UR STRIP: 7 PH UNITS
PROT UR QL STRIP: NEGATIVE
RBC # UR STRIP: NEGATIVE /UL
SP GR UR STRIP: 1.02
UROBILINOGEN UR STRIP-ACNC: 1 MG/DL

## 2024-06-04 PROCEDURE — 96372 THER/PROPH/DIAG INJ SC/IM: CPT

## 2024-06-04 PROCEDURE — 63600175 PHARM REV CODE 636 W HCPCS

## 2024-06-04 PROCEDURE — 99284 EMERGENCY DEPT VISIT MOD MDM: CPT | Mod: 25

## 2024-06-04 PROCEDURE — 99284 EMERGENCY DEPT VISIT MOD MDM: CPT | Mod: ,,,

## 2024-06-04 PROCEDURE — 81003 URINALYSIS AUTO W/O SCOPE: CPT

## 2024-06-04 RX ORDER — KETOROLAC TROMETHAMINE 30 MG/ML
30 INJECTION, SOLUTION INTRAMUSCULAR; INTRAVENOUS
Status: COMPLETED | OUTPATIENT
Start: 2024-06-04 | End: 2024-06-04

## 2024-06-04 RX ORDER — ORPHENADRINE CITRATE 30 MG/ML
60 INJECTION INTRAMUSCULAR; INTRAVENOUS
Status: DISCONTINUED | OUTPATIENT
Start: 2024-06-04 | End: 2024-06-04 | Stop reason: HOSPADM

## 2024-06-04 RX ORDER — METHOCARBAMOL 750 MG/1
750 TABLET, FILM COATED ORAL 3 TIMES DAILY
Qty: 15 TABLET | Refills: 0 | Status: SHIPPED | OUTPATIENT
Start: 2024-06-04 | End: 2024-06-09

## 2024-06-04 RX ORDER — KETOROLAC TROMETHAMINE 30 MG/ML
60 INJECTION, SOLUTION INTRAMUSCULAR; INTRAVENOUS
Status: DISCONTINUED | OUTPATIENT
Start: 2024-06-04 | End: 2024-06-04

## 2024-06-04 RX ADMIN — KETOROLAC TROMETHAMINE 30 MG: 30 INJECTION, SOLUTION INTRAMUSCULAR at 11:06

## 2024-06-04 NOTE — ED PROVIDER NOTES
Encounter Date: 2024       History     Chief Complaint   Patient presents with    Back Pain     Left lower back pain radiating down left leg. Onset yesterday.      Patient is a 33-year-old female who presents to emergency department with complaints of left lower back pain radiate down her left leg.  She reports the pain began yesterday.  She was describes the pain as sharp and shooting.  She did treat with bear back pain relief but reports only minimal improvement.  She does have a history of epidural injections during childbirth but this was greater than 10 years ago.  She denies any recent known injury or trauma.  Denies any weakness, numbness, loss of bowel or bladder control, saddle anesthesia, fever, chills, or any other complaints.  She does have a complicated past medical history of anxiety, depression, hypertension, GERD, and hyperlipidemia.  She reports usual compliance with her medication but did not take her blood pressure medicine this morning.  She denies any history of chronic back pain.  Current blood pressure is 178/124, temperature 98.8°, heart rate 81, respirations 18, and oxygen saturation 98% on room air.  She does appear uncomfortable but in no immediate distress.    The history is provided by the patient.     Review of patient's allergies indicates:   Allergen Reactions    Clindamycin Hives and Rash     swelling     Past Medical History:   Diagnosis Date    Anxiety     Depression     Depression 10/11/2021    Essential hypertension 10/11/2021    Gastroesophageal reflux disease 10/11/2021    History of ovarian cyst     Hyperlipidemia 2023    Hypertension     Prediabetes 2023     Past Surgical History:   Procedure Laterality Date     SECTION       x 1    CHOLECYSTECTOMY      LAPAROSCOPIC SALPINGO-OOPHORECTOMY Bilateral 2021    Procedure: SALPINGO-OOPHORECTOMY, LAPAROSCOPIC;  Surgeon: Bryce Juares MD;  Location: [facility];  Service: OB/GYN;  Laterality:  Bilateral;    LAPAROSCOPIC TOTAL HYSTERECTOMY Bilateral 9/1/2021    Procedure: HYSTERECTOMY, TOTAL VAGINAL LAPAROSCOPIC;  Surgeon: Bryce Juares MD;  Location: [facility] [place];  Service: OB/GYN;  Laterality: Bilateral;  vaginal - vpath     TUBAL LIGATION  12/2014     Family History   Problem Relation Name Age of Onset    Ovarian cancer Maternal Grandmother      Hypertension Father      Diabetes Father      Heart disease Father      Atrial fibrillation Father      Diabetes Mother      Hypertension Mother      Heart disease Mother      Breast cancer Son      Breast cancer Son       Social History     Tobacco Use    Smoking status: Former     Types: Cigars, Cigarettes     Passive exposure: Current    Smokeless tobacco: Never    Tobacco comments:     Quit smoking 2 months ago.   Substance Use Topics    Alcohol use: Yes     Comment: Social    Drug use: Yes     Types: Marijuana     Review of Systems   Constitutional:  Negative for activity change, appetite change, chills and fever.   HENT:  Negative for congestion, sore throat and trouble swallowing.    Eyes: Negative.    Respiratory:  Negative for cough and shortness of breath.    Cardiovascular:  Negative for chest pain and palpitations.   Gastrointestinal:  Negative for abdominal distention, abdominal pain, diarrhea, nausea and vomiting.   Endocrine: Negative.    Genitourinary:  Negative for difficulty urinating and dysuria.   Musculoskeletal:  Positive for arthralgias and back pain. Negative for neck pain and neck stiffness.   Skin:  Negative for color change, pallor and rash.   Allergic/Immunologic: Negative.    Neurological:  Negative for dizziness, speech difficulty, weakness, light-headedness, numbness and headaches.   Hematological:  Does not bruise/bleed easily.   Psychiatric/Behavioral:  Negative for confusion. The patient is not nervous/anxious.    All other systems reviewed and are negative.      Physical Exam     Initial Vitals [06/04/24 1117]   BP Pulse  Resp Temp SpO2   (!) 178/124 81 18 98.8 °F (37.1 °C) 98 %      MAP       --         Physical Exam    Nursing note and vitals reviewed.  Constitutional: She appears well-developed and well-nourished. She is not diaphoretic. She is Obese . She is active and cooperative.  Non-toxic appearance.   HENT:   Head: Normocephalic and atraumatic.   Mouth/Throat: Oropharynx is clear and moist.   Eyes: Conjunctivae and EOM are normal. Pupils are equal, round, and reactive to light.   Neck: Neck supple.   Normal range of motion.  Cardiovascular:  Normal rate, regular rhythm and normal heart sounds.           Pulmonary/Chest: Breath sounds normal. No respiratory distress. She has no wheezes. She has no rhonchi. She has no rales. She exhibits no tenderness.   Abdominal: Abdomen is soft. Bowel sounds are normal. She exhibits no distension. There is no abdominal tenderness. There is no rebound and no guarding.   Musculoskeletal:      Cervical back: Normal, normal range of motion and neck supple.      Thoracic back: Normal.      Lumbar back: Spasms (left paraspinal muscles.) and bony tenderness present. No swelling, edema or signs of trauma. Normal range of motion. Positive left straight leg raise test.     Neurological: She is alert and oriented to person, place, and time. She has normal strength. GCS score is 15. GCS eye subscore is 4. GCS verbal subscore is 5. GCS motor subscore is 6.   Skin: Skin is warm and dry. Capillary refill takes less than 2 seconds.   Psychiatric: She has a normal mood and affect. Her behavior is normal. Judgment and thought content normal.         Medical Screening Exam   See Full Note    ED Course   Procedures  Labs Reviewed   URINALYSIS, REFLEX TO URINE CULTURE          Imaging Results    None          Medications   orphenadrine injection 60 mg (60 mg Intramuscular Not Given 6/4/24 1130)   ketorolac injection 30 mg (30 mg Intramuscular Given 6/4/24 1122)     Medical Decision Making  Patient presents to  the ED for evaluation of left lower back pain radiating down her left leg.  He was alert and oriented x4.  GCS 15.  Her blood pressure is elevated at the time of triage but vital signs are within normal limits otherwise.  She does have tenderness and spasm in her left lower paraspinal muscles and her exam is consistent with sciatica.  She denies any symptoms of cauda equina syndrome.  She was status post hysterectomy.  We will perform a urinalysis for further evaluation.  We will provide Toradol 30 mg IM.  We did offer Norflex 60 mg IM but the patient declines because she does not have a  and is concerned she may be drowsy on the way home.    12:15 patient re-evaluated at this time.  She does report some mild improvement in her discomfort.  We will plan to continue muscle relaxers at home for continued pain control.  Urinalysis was unremarkable.  We did recommend a follow up with the primary care provider in 1-2 days for a recheck and to discuss further outpatient treatment strategies such as physical therapy, MRI, or any other interventions they feel necessary at that time.  Red flag warning signs of back pain were discussed in detail with the patient and she was instructed to return to emergency department if any of these should develop.  All questions were answered.  She verbalizes understanding and is in agreement with this plan.    Amount and/or Complexity of Data Reviewed  Independent Historian:      Details: Patient is a 33-year-old female who presents to emergency department with complaints of left lower back pain radiate down her left leg.  She reports the pain began yesterday.  She was describes the pain as sharp and shooting.  She did treat with bear back pain relief but reports only minimal improvement.  She does have a history of epidural injections during childbirth but this was greater than 10 years ago.  She denies any recent known injury or trauma.  Denies any weakness, numbness, loss of bowel  or bladder control, saddle anesthesia, fever, chills, or any other complaints.  She does have a complicated past medical history of anxiety, depression, hypertension, GERD, and hyperlipidemia.  She reports usual compliance with her medication but did not take her blood pressure medicine this morning.  She denies any history of chronic back pain.  Current blood pressure is 178/124, temperature 98.8°, heart rate 81, respirations 18, and oxygen saturation 98% on room air.  She does appear uncomfortable but in no immediate distress.  Labs: ordered.     Details: Urinalysis unremarkable.    Risk  Prescription drug management.  Risk Details: Not consistent with fracture, AAA, kidney stone, surgical abdomen, kidney infection, renal failure, pneumonia, paraspinal or epidural infection, cord compression or cauda equina, pyelonephritis or UTI, or other emergent cause.    Data Reviewed/Counseling: I have reviewed the patient's vital signs, nursing notes, and other relevant tests/information. I had a detailed discussion regarding the historical points, exam findings, and any diagnostic results supporting the discharge diagnosis. I also discussed the need for outpatient follow-up and the need to return to the ED if symptoms worsen or if there are any questions or concerns that arise at home.   Final diagnoses:  [M54.42] Acute left-sided low back pain with left-sided sciatica (Primary)                                      Clinical Impression:   Final diagnoses:  [M54.42] Acute left-sided low back pain with left-sided sciatica (Primary)        ED Disposition Condition    Discharge Stable          ED Prescriptions       Medication Sig Dispense Start Date End Date Auth. Provider    methocarbamoL (ROBAXIN) 750 MG Tab Take 1 tablet (750 mg total) by mouth 3 (three) times daily. for 5 days 15 tablet 6/4/2024 6/9/2024 Kris Sharif FNP          Follow-up Information       Follow up With Specialties Details Why Contact Info     Harriet Archer, [facility] Schedule an appointment as soon as possible for a visit in 2 days  [address] [place] [address]  [facility] [place] [address]  [phone]               Kris Sharif, [facility]  06/04/24 2323

## 2024-06-04 NOTE — Clinical Note
"Angelica DELGADOENRIQUE Orozco was seen and treated in our emergency department on 6/4/2024.  She may return to work on 06/06/2024.  No lifting over 5 lbs until instructed otherwise by Primary Care Provider.     If you have any questions or concerns, please don't hesitate to call.      Kris Sharif, [facility]PRE[facility]"

## 2024-06-04 NOTE — DISCHARGE INSTRUCTIONS
Take Robaxin as prescribed.  Alternate Tylenol and ibuprofen over-the-counter every 4 hours as directed for pain control.  Performed sciatica exercises as tolerated.  If any of these exercises cause you pain, we do recommend that you discontinue use of these particular exercises.  Follow up with the primary care provider in 1-2 days for a recheck and to discuss further outpatient treatment management strategies such as physical therapy, MRI, or any other interventions they feel necessary at that time.  Return to the emergency department for fever, chills, weakness, numbness, loss of bowel or bladder control, or any other new or worrisome symptoms.

## 2024-07-10 ENCOUNTER — OFFICE VISIT (OUTPATIENT)
Dept: PRIMARY CARE CLINIC | Facility: CLINIC | Age: 34
End: 2024-07-10
Payer: OTHER MISCELLANEOUS

## 2024-07-10 VITALS
HEART RATE: 82 BPM | OXYGEN SATURATION: 100 % | BODY MASS INDEX: 32.2 KG/M2 | HEIGHT: 71 IN | RESPIRATION RATE: 20 BRPM | SYSTOLIC BLOOD PRESSURE: 166 MMHG | WEIGHT: 230 LBS | DIASTOLIC BLOOD PRESSURE: 94 MMHG | TEMPERATURE: 98 F

## 2024-07-10 DIAGNOSIS — S39.012D LUMBAR STRAIN, SUBSEQUENT ENCOUNTER: Primary | ICD-10-CM

## 2024-07-10 RX ORDER — METHOCARBAMOL 500 MG/1
500 TABLET, FILM COATED ORAL 4 TIMES DAILY
Qty: 30 TABLET | Refills: 0 | Status: SHIPPED | OUTPATIENT
Start: 2024-07-10

## 2024-07-10 RX ORDER — IBUPROFEN 600 MG/1
600 TABLET ORAL 3 TIMES DAILY
Qty: 30 TABLET | Refills: 0 | Status: SHIPPED | OUTPATIENT
Start: 2024-07-10

## 2024-07-10 NOTE — PROGRESS NOTES
Subjective     Patient ID: Sophy Orozco is a 33 y.o. female.    Chief Complaint: Work Related Injury (Patient presents here today with work related injury to left lower back that occurred on 6/3/24. Patient states she was pouring some liquids off of some black olives when she hurt her back.)    34 y/o wf presents for follow up low back pain. 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 She denies loss of bowel or bladder. She denies numbness.       Review of Systems   Genitourinary:  Negative for bladder incontinence.   Musculoskeletal:  Positive for back pain. Negative for gait problem, joint swelling and myalgias.   Neurological:  Negative for weakness and numbness.   All other systems reviewed and are negative.         Objective     Physical Exam  Vitals and nursing note reviewed.   Constitutional:       Appearance: Normal appearance.   HENT:      Head: Normocephalic.   Eyes:      Pupils: Pupils are equal, round, and reactive to light.   Cardiovascular:      Rate and Rhythm: Normal rate and regular rhythm.      Heart sounds: Normal heart sounds.   Pulmonary:      Effort: Pulmonary effort is normal.      Breath sounds: Normal breath sounds.   Musculoskeletal:         General: Tenderness and signs of injury present. Normal range of motion.      Cervical back: Normal range of motion.   Skin:     General: Skin is warm and dry.   Neurological:      General: No focal deficit present.      Mental Status: She is alert and oriented to person, place, and time.   Psychiatric:         Attention and Perception: Attention and perception normal.         Mood and Affect: Mood normal.         Speech: Speech normal.         Behavior: Behavior normal. Behavior is cooperative.         Cognition and Memory: Cognition normal.         Judgment: Judgment normal.            Assessment and Plan      1. Lumbar strain, subsequent encounter    Other orders  -     ibuprofen (ADVIL,MOTRIN) 600 MG tablet; Take 1 tablet (600 mg total) by mouth 3 (three) times daily.  Dispense: 30 tablet; Refill: 0  -     methocarbamoL (ROBAXIN) 500 MG Tab; Take 1 tablet (500 mg total) by mouth 4 (four) times daily.  Dispense: 30 tablet; Refill: 0        Take medication as prescribed. RTW with limited duty. No lifting over 15-20 lbs. No prolonged standing or walking over 2 hours. RTN to [facility] in 1 week.          No follow-ups on file.

## 2024-07-17 ENCOUNTER — OFFICE VISIT (OUTPATIENT)
Dept: PRIMARY CARE CLINIC | Facility: CLINIC | Age: 34
End: 2024-07-17
Payer: OTHER MISCELLANEOUS

## 2024-07-17 VITALS
TEMPERATURE: 99 F | DIASTOLIC BLOOD PRESSURE: 78 MMHG | RESPIRATION RATE: 20 BRPM | OXYGEN SATURATION: 98 % | SYSTOLIC BLOOD PRESSURE: 125 MMHG | HEART RATE: 92 BPM | HEIGHT: 71 IN | WEIGHT: 230 LBS | BODY MASS INDEX: 32.2 KG/M2

## 2024-07-17 DIAGNOSIS — S39.012D LUMBAR STRAIN, SUBSEQUENT ENCOUNTER: Primary | ICD-10-CM

## 2024-07-17 PROCEDURE — 99212 OFFICE O/P EST SF 10 MIN: CPT | Mod: ,,, | Performed by: NURSE PRACTITIONER

## 2024-07-17 NOTE — PROGRESS NOTES
Subjective     Patient ID: Sophy Orozco is a 33 y.o. female.    Chief Complaint: Work Related Injury (Patient presents here today with work related injury to left lower back that occurred on 6/3/24.)    34 y/o bf presents for follow up lumbar strain that occurred at work on 6/3/24. She states she still has intermittent pain but for the most part it is gone. She is still taking medication intermittently.       Review of Systems   Gastrointestinal:  Negative for fecal incontinence.   Genitourinary:  Negative for bladder incontinence.   Musculoskeletal:  Negative for back pain, joint swelling and myalgias.   Neurological:  Negative for weakness and numbness.   All other systems reviewed and are negative.         Objective     Physical Exam  Vitals and nursing note reviewed.   Constitutional:       Appearance: Normal appearance.   HENT:      Head: Normocephalic.   Eyes:      Pupils: Pupils are equal, round, and reactive to light.   Cardiovascular:      Rate and Rhythm: Normal rate and regular rhythm.      Heart sounds: Normal heart sounds.   Pulmonary:      Effort: Pulmonary effort is normal.      Breath sounds: Normal breath sounds.   Musculoskeletal:         General: Signs of injury present. No swelling or tenderness. Normal range of motion.      Cervical back: Normal range of motion.   Skin:     General: Skin is warm and dry.   Neurological:      General: No focal deficit present.      Mental Status: She is alert and oriented to person, place, and time.   Psychiatric:         Attention and Perception: Attention and perception normal.         Mood and Affect: Mood normal.         Speech: Speech normal.         Behavior: Behavior normal. Behavior is cooperative.         Cognition and Memory: Cognition normal.         Judgment: Judgment normal.            Assessment and Plan     1. Lumbar strain, subsequent encounter        RTW full duty. RTN to [facility]         No follow-ups on file.

## 2025-05-19 ENCOUNTER — OFFICE VISIT (OUTPATIENT)
Dept: FAMILY MEDICINE | Facility: CLINIC | Age: 35
End: 2025-05-19
Payer: COMMERCIAL

## 2025-05-19 VITALS
DIASTOLIC BLOOD PRESSURE: 83 MMHG | BODY MASS INDEX: 35.39 KG/M2 | WEIGHT: 252.81 LBS | HEART RATE: 81 BPM | HEIGHT: 71 IN | SYSTOLIC BLOOD PRESSURE: 134 MMHG | OXYGEN SATURATION: 98 %

## 2025-05-19 DIAGNOSIS — R73.03 PREDIABETES: Chronic | ICD-10-CM

## 2025-05-19 DIAGNOSIS — E66.01 CLASS 2 SEVERE OBESITY DUE TO EXCESS CALORIES WITH SERIOUS COMORBIDITY AND BODY MASS INDEX (BMI) OF 35.0 TO 35.9 IN ADULT: ICD-10-CM

## 2025-05-19 DIAGNOSIS — E78.5 HYPERLIPIDEMIA, UNSPECIFIED HYPERLIPIDEMIA TYPE: Chronic | ICD-10-CM

## 2025-05-19 DIAGNOSIS — T38.3X5A ADVERSE EFFECT OF METFORMIN, INITIAL ENCOUNTER: ICD-10-CM

## 2025-05-19 DIAGNOSIS — F32.A DEPRESSION, UNSPECIFIED DEPRESSION TYPE: Chronic | ICD-10-CM

## 2025-05-19 DIAGNOSIS — F17.200 CURRENT EVERY DAY SMOKER: ICD-10-CM

## 2025-05-19 DIAGNOSIS — F41.9 ANXIETY: Chronic | ICD-10-CM

## 2025-05-19 DIAGNOSIS — I10 ESSENTIAL HYPERTENSION: Primary | Chronic | ICD-10-CM

## 2025-05-19 DIAGNOSIS — Z13.29 SCREENING FOR HYPOTHYROIDISM: ICD-10-CM

## 2025-05-19 DIAGNOSIS — E66.812 CLASS 2 SEVERE OBESITY DUE TO EXCESS CALORIES WITH SERIOUS COMORBIDITY AND BODY MASS INDEX (BMI) OF 35.0 TO 35.9 IN ADULT: ICD-10-CM

## 2025-05-19 PROBLEM — N94.10 DYSPAREUNIA, FEMALE: Status: RESOLVED | Noted: 2021-09-01 | Resolved: 2025-05-19

## 2025-05-19 PROBLEM — S39.012D LUMBAR STRAIN, SUBSEQUENT ENCOUNTER: Status: RESOLVED | Noted: 2024-07-10 | Resolved: 2025-05-19

## 2025-05-19 PROBLEM — N92.0 MENORRHAGIA WITH REGULAR CYCLE: Status: RESOLVED | Noted: 2021-09-01 | Resolved: 2025-05-19

## 2025-05-19 PROBLEM — R10.2 PELVIC PAIN: Status: RESOLVED | Noted: 2021-09-01 | Resolved: 2025-05-19

## 2025-05-19 PROBLEM — R73.09 ELEVATED HEMOGLOBIN A1C: Status: RESOLVED | Noted: 2022-11-09 | Resolved: 2025-05-19

## 2025-05-19 LAB
ALBUMIN SERPL BCP-MCNC: 3.5 G/DL (ref 3.5–5)
ALBUMIN/GLOB SERPL: 1 {RATIO}
ALP SERPL-CCNC: 85 U/L (ref 40–150)
ALT SERPL W P-5'-P-CCNC: 25 U/L
ANION GAP SERPL CALCULATED.3IONS-SCNC: 9 MMOL/L (ref 7–16)
AST SERPL W P-5'-P-CCNC: 22 U/L (ref 11–45)
BASOPHILS # BLD AUTO: 0.03 K/UL (ref 0–0.2)
BASOPHILS NFR BLD AUTO: 0.4 % (ref 0–1)
BILIRUB SERPL-MCNC: 0.4 MG/DL
BUN SERPL-MCNC: 8 MG/DL (ref 7–19)
BUN/CREAT SERPL: 9 (ref 6–20)
CALCIUM SERPL-MCNC: 9 MG/DL (ref 8.4–10.2)
CHLORIDE SERPL-SCNC: 107 MMOL/L (ref 98–107)
CHOLEST SERPL-MCNC: 175 MG/DL
CHOLEST/HDLC SERPL: 6 {RATIO}
CO2 SERPL-SCNC: 27 MMOL/L (ref 22–29)
CREAT SERPL-MCNC: 0.87 MG/DL (ref 0.55–1.02)
DIFFERENTIAL METHOD BLD: ABNORMAL
EGFR (NO RACE VARIABLE) (RUSH/TITUS): 89 ML/MIN/1.73M2
EOSINOPHIL # BLD AUTO: 0.13 K/UL (ref 0–0.5)
EOSINOPHIL NFR BLD AUTO: 1.7 % (ref 1–4)
ERYTHROCYTE [DISTWIDTH] IN BLOOD BY AUTOMATED COUNT: 12.6 % (ref 11.5–14.5)
EST. AVERAGE GLUCOSE BLD GHB EST-MCNC: 174 MG/DL
GLOBULIN SER-MCNC: 3.4 G/DL (ref 2–4)
GLUCOSE SERPL-MCNC: 151 MG/DL (ref 74–100)
HBA1C MFR BLD HPLC: 7.7 %
HCT VFR BLD AUTO: 43.6 % (ref 38–47)
HDLC SERPL-MCNC: 29 MG/DL (ref 35–60)
HGB BLD-MCNC: 14.2 G/DL (ref 12–16)
IMM GRANULOCYTES # BLD AUTO: 0.02 K/UL (ref 0–0.04)
IMM GRANULOCYTES NFR BLD: 0.3 % (ref 0–0.4)
LDLC SERPL CALC-MCNC: 126 MG/DL
LDLC/HDLC SERPL: 4.3 {RATIO}
LYMPHOCYTES # BLD AUTO: 2.4 K/UL (ref 1–4.8)
LYMPHOCYTES NFR BLD AUTO: 32.1 % (ref 27–41)
MCH RBC QN AUTO: 28 PG (ref 27–31)
MCHC RBC AUTO-ENTMCNC: 32.6 G/DL (ref 32–36)
MCV RBC AUTO: 86 FL (ref 80–96)
MONOCYTES # BLD AUTO: 0.48 K/UL (ref 0–0.8)
MONOCYTES NFR BLD AUTO: 6.4 % (ref 2–6)
MPC BLD CALC-MCNC: 11.4 FL (ref 9.4–12.4)
NEUTROPHILS # BLD AUTO: 4.41 K/UL (ref 1.8–7.7)
NEUTROPHILS NFR BLD AUTO: 59.1 % (ref 53–65)
NONHDLC SERPL-MCNC: 146 MG/DL
NRBC # BLD AUTO: 0 X10E3/UL
NRBC, AUTO (.00): 0 %
PLATELET # BLD AUTO: 273 K/UL (ref 150–400)
POTASSIUM SERPL-SCNC: 4.3 MMOL/L (ref 3.5–5.1)
PROT SERPL-MCNC: 6.9 G/DL (ref 6.4–8.3)
RBC # BLD AUTO: 5.07 M/UL (ref 4.2–5.4)
SODIUM SERPL-SCNC: 139 MMOL/L (ref 136–145)
TRIGL SERPL-MCNC: 102 MG/DL (ref 37–140)
TSH SERPL DL<=0.005 MIU/L-ACNC: 1.39 UIU/ML (ref 0.35–4.94)
VLDLC SERPL-MCNC: 20 MG/DL
WBC # BLD AUTO: 7.47 K/UL (ref 4.5–11)

## 2025-05-19 PROCEDURE — 83036 HEMOGLOBIN GLYCOSYLATED A1C: CPT | Mod: ,,, | Performed by: CLINICAL MEDICAL LABORATORY

## 2025-05-19 PROCEDURE — 80050 GENERAL HEALTH PANEL: CPT | Mod: ,,, | Performed by: CLINICAL MEDICAL LABORATORY

## 2025-05-19 PROCEDURE — 3079F DIAST BP 80-89 MM HG: CPT | Mod: ,,, | Performed by: NURSE PRACTITIONER

## 2025-05-19 PROCEDURE — 1160F RVW MEDS BY RX/DR IN RCRD: CPT | Mod: ,,, | Performed by: NURSE PRACTITIONER

## 2025-05-19 PROCEDURE — 1159F MED LIST DOCD IN RCRD: CPT | Mod: ,,, | Performed by: NURSE PRACTITIONER

## 2025-05-19 PROCEDURE — 3075F SYST BP GE 130 - 139MM HG: CPT | Mod: ,,, | Performed by: NURSE PRACTITIONER

## 2025-05-19 PROCEDURE — 80061 LIPID PANEL: CPT | Mod: ,,, | Performed by: CLINICAL MEDICAL LABORATORY

## 2025-05-19 PROCEDURE — 99214 OFFICE O/P EST MOD 30 MIN: CPT | Mod: ,,, | Performed by: NURSE PRACTITIONER

## 2025-05-19 PROCEDURE — 3008F BODY MASS INDEX DOCD: CPT | Mod: ,,, | Performed by: NURSE PRACTITIONER

## 2025-05-19 RX ORDER — ESCITALOPRAM OXALATE 10 MG/1
10 TABLET ORAL DAILY
Qty: 90 TABLET | Refills: 0 | Status: SHIPPED | OUTPATIENT
Start: 2025-05-19

## 2025-05-19 NOTE — PROGRESS NOTES
Clinic note     Patient name: Sophy Orozco is a 34 y.o. female   Chief compliant   Chief Complaint   Patient presents with    Annual Exam     Now has current insurance and would like to restart her medications.  No problems voiced.        Subjective     History of present illness   Last office visit 10/18/2023, has been lost to follow up since that time   She states health insurance recently restarted and needs to discuss getting back on her medications     Past Medical History: prediabetes, HTN, HLD, GERD, depression, anxiety    Metformin not tolerated due to severe n/v and diarrhea           Social History[1]    Review of patient's allergies indicates:   Allergen Reactions    Clindamycin Hives and Rash     swelling       Past Medical History:   Diagnosis Date    Anxiety     Depression     Depression 10/11/2021    Essential hypertension 10/11/2021    Gastroesophageal reflux disease 10/11/2021    History of ovarian cyst     Hyperlipidemia 2023    Hypertension     Prediabetes 2023       Past Surgical History:   Procedure Laterality Date     SECTION       x 1    CHOLECYSTECTOMY      LAPAROSCOPIC SALPINGO-OOPHORECTOMY Bilateral 2021    Procedure: SALPINGO-OOPHORECTOMY, LAPAROSCOPIC;  Surgeon: Bryce Juares MD;  Location: [facility] OR;  Service: OB/GYN;  Laterality: Bilateral;    LAPAROSCOPIC TOTAL HYSTERECTOMY Bilateral 2021    Procedure: HYSTERECTOMY, TOTAL VAGINAL LAPAROSCOPIC;  Surgeon: Bryce Juares MD;  Location: [facility] OR;  Service: OB/GYN;  Laterality: Bilateral;  vaginal - vpath     TUBAL LIGATION  2014        Family History   Problem Relation Name Age of Onset    Ovarian cancer Maternal Grandmother Jazmin     Hypertension Father Stewart     Diabetes Father Stewart     Heart disease Father Stewart     Atrial fibrillation Father Stewart     Diabetes Mother Doris     Hypertension Mother Doris     Heart disease Mother Doris     Breast cancer Son       "Breast cancer Son         Current Medications[2]    Review of Systems   Constitutional:  Negative for appetite change, chills, fatigue, fever and unexpected weight change.   Respiratory:  Negative for cough and shortness of breath.    Cardiovascular:  Negative for chest pain, palpitations and leg swelling.   Gastrointestinal:  Negative for abdominal pain, change in bowel habit, constipation, diarrhea, nausea and vomiting.   Genitourinary:  Positive for hot flashes. Negative for dysuria and frequency.   Musculoskeletal:  Negative for arthralgias, gait problem and myalgias.   Neurological:  Negative for dizziness, syncope, light-headedness and headaches.   Psychiatric/Behavioral:  Positive for dysphoric mood. Negative for sleep disturbance. The patient is nervous/anxious.        Objective     /83 (Patient Position: Sitting)   Pulse 81   Ht 5' 11" (1.803 m)   Wt 114.7 kg (252 lb 12.8 oz)   LMP 08/25/2021 (Exact Date)   SpO2 98%   BMI 35.26 kg/m²     Physical Exam   Constitutional: She is oriented to person, place, and time. She appears obese. She is cooperative. No distress.   HENT:   Head: Atraumatic.   Mouth/Throat: Mucous membranes are moist.   Cardiovascular: Normal rate and regular rhythm. Pulmonary:      Effort: Pulmonary effort is normal. No respiratory distress.      Breath sounds: Normal breath sounds. No wheezing, rhonchi or rales.     Abdominal: Soft. Bowel sounds are normal. She exhibits no distension. There is no abdominal tenderness.   Musculoskeletal:         General: Normal range of motion.      Cervical back: Neck supple.      Right lower leg: No edema.      Left lower leg: No edema.   Neurological: She is alert and oriented to person, place, and time. Gait normal.   Skin: Skin is warm and dry.   Psychiatric: Her speech is normal and behavior is normal. Mood, affect and thought content normal. Thought content is not paranoid. She expresses no homicidal and no suicidal ideation. She " expresses no suicidal plans and no homicidal plans.       Lab Results   Component Value Date    WBC 8.45 10/18/2023    HGB 13.4 10/18/2023    HCT 40.6 10/18/2023    MCV 84.8 10/18/2023     10/18/2023       CMP  Sodium   Date Value Ref Range Status   10/18/2023 142 136 - 145 mmol/L Final     Potassium   Date Value Ref Range Status   10/18/2023 3.5 3.5 - 5.1 mmol/L Final     Chloride   Date Value Ref Range Status   10/18/2023 110 (H) 98 - 107 mmol/L Final     CO2   Date Value Ref Range Status   10/18/2023 24 21 - 32 mmol/L Final     Glucose   Date Value Ref Range Status   10/18/2023 98 74 - 106 mg/dL Final     BUN   Date Value Ref Range Status   10/18/2023 10 7 - 18 mg/dL Final     Creatinine   Date Value Ref Range Status   10/18/2023 0.96 0.55 - 1.02 mg/dL Final     Calcium   Date Value Ref Range Status   10/18/2023 8.4 (L) 8.5 - 10.1 mg/dL Final     Total Protein   Date Value Ref Range Status   10/18/2023 7.3 6.4 - 8.2 g/dL Final     Albumin   Date Value Ref Range Status   10/18/2023 3.3 (L) 3.5 - 5.0 g/dL Final     Bilirubin, Total   Date Value Ref Range Status   10/18/2023 0.2 >0.0 - 1.2 mg/dL Final     Alk Phos   Date Value Ref Range Status   10/18/2023 98 37 - 98 U/L Final     AST   Date Value Ref Range Status   10/18/2023 10 (L) 15 - 37 U/L Final     ALT   Date Value Ref Range Status   10/18/2023 26 13 - 56 U/L Final     Anion Gap   Date Value Ref Range Status   10/18/2023 12 7 - 16 mmol/L Final     eGFR   Date Value Ref Range Status   10/11/2021 71 >=60 mL/min/1.73m² Final     Lab Results   Component Value Date    TSH 2.180 10/26/2022     Lab Results   Component Value Date    CHOL 180 10/18/2023    CHOL 189 10/26/2022    CHOL 158 10/11/2021     Lab Results   Component Value Date    HDL 48 10/18/2023    HDL 35 (L) 10/26/2022    HDL 31 (L) 10/11/2021     Lab Results   Component Value Date    LDLCALC 117 10/18/2023    LDLCALC 128 10/26/2022    LDLCALC 106 10/11/2021     Lab Results   Component Value Date     TRIG 75 10/18/2023    TRIG 130 10/26/2022    TRIG 104 10/11/2021     Lab Results   Component Value Date    CHOLHDL 3.8 10/18/2023    CHOLHDL 5.4 10/26/2022    CHOLHDL 5.1 10/11/2021     Lab Results   Component Value Date    HGBA1C 6.1 10/18/2023       Lab Results   Component Value Date    [id number] Negative 08/31/2021     Any diagnostic testing results obtained in office or prior to appointment were reviewed were reviewed with patient.    Health Maintenance Due   Topic Date Due    Hepatitis C Screening  Never done    HIV Screening  Never done    TETANUS VACCINE  Never done    COVID-19 Vaccine (1 - 2024-25 season) Never done    Hemoglobin A1c (Prediabetes)  10/18/2024         Health Maintenance Topics with due status: Not Due       Topic Last Completion Date    Influenza Vaccine 10/26/2022    Pap Smear 10/17/2023    RSV Vaccine (Age 60+ and Pregnant patients) Not Due         Assessment and Plan   Essential hypertension  -     CBC Auto Differential; Future; Expected date: 05/19/2025  -     Comprehensive Metabolic Panel; Future; Expected date: 05/19/2025    Prediabetes  -     Hemoglobin A1C; Future; Expected date: 05/19/2025    Hyperlipidemia, unspecified hyperlipidemia type  -     Lipid Panel; Future; Expected date: 05/19/2025    Anxiety  -     EScitalopram oxalate (LEXAPRO) 10 MG tablet; Take 1 tablet (10 mg total) by mouth once daily.  Dispense: 90 tablet; Refill: 0    Depression, unspecified depression type  -     EScitalopram oxalate (LEXAPRO) 10 MG tablet; Take 1 tablet (10 mg total) by mouth once daily.  Dispense: 90 tablet; Refill: 0    Screening for hypothyroidism  -     TSH; Future; Expected date: 05/19/2025    Adverse effect of metformin, initial encounter    Current every day smoker    Class 2 severe obesity due to excess calories with serious comorbidity and body mass index (BMI) of 35.0 to 35.9 in adult          Patient Instructions  Patient Instructions   Lab obtained in clinic today, we will  notify you of results when available     Check blood pressure in the am and again in the afternoon, keep written log and bring in to follow up visit in one week   We will review all lab results at that time       Schedule appointment with gyn of your choice to discuss hormone replacement therapy                                  [1]   Social History  Tobacco Use    Smoking status: Every Day     Current packs/day: 0.50     Average packs/day: 0.5 packs/day for 10.4 years (5.2 ttl pk-yrs)     Types: Cigarettes     Start date: 2015     Passive exposure: Current    Smokeless tobacco: Never    Tobacco comments:     Quit smoking 2 months ago.   Substance Use Topics    Alcohol use: Yes     Comment: Social    Drug use: Yes     Types: Marijuana   [2]   Current Outpatient Medications:     EScitalopram oxalate (LEXAPRO) 10 MG tablet, Take 1 tablet (10 mg total) by mouth once daily., Disp: 90 tablet, Rfl: 0    estrogens, conjugated, (PREMARIN) 0.625 MG tablet, Take 1 tablet (0.625 mg total) by mouth once daily., Disp: 30 tablet, Rfl: 11

## 2025-05-19 NOTE — PATIENT INSTRUCTIONS
Lab obtained in clinic today, we will notify you of results when available     Check blood pressure in the am and again in the afternoon, keep written log and bring in to follow up visit in one week   We will review all lab results at that time       Schedule appointment with gyn of your choice to discuss hormone replacement therapy

## 2025-05-20 ENCOUNTER — RESULTS FOLLOW-UP (OUTPATIENT)
Dept: FAMILY MEDICINE | Facility: CLINIC | Age: 35
End: 2025-05-20

## 2025-05-27 ENCOUNTER — OFFICE VISIT (OUTPATIENT)
Dept: FAMILY MEDICINE | Facility: CLINIC | Age: 35
End: 2025-05-27
Payer: COMMERCIAL

## 2025-05-27 VITALS
HEART RATE: 90 BPM | OXYGEN SATURATION: 99 % | HEIGHT: 71 IN | DIASTOLIC BLOOD PRESSURE: 96 MMHG | SYSTOLIC BLOOD PRESSURE: 151 MMHG | WEIGHT: 255.31 LBS | RESPIRATION RATE: 13 BRPM | BODY MASS INDEX: 35.74 KG/M2

## 2025-05-27 DIAGNOSIS — I10 ESSENTIAL HYPERTENSION: Chronic | ICD-10-CM

## 2025-05-27 DIAGNOSIS — E66.01 CLASS 2 SEVERE OBESITY DUE TO EXCESS CALORIES WITH SERIOUS COMORBIDITY AND BODY MASS INDEX (BMI) OF 35.0 TO 35.9 IN ADULT: Chronic | ICD-10-CM

## 2025-05-27 DIAGNOSIS — E11.9 TYPE 2 DIABETES MELLITUS WITHOUT COMPLICATION, WITHOUT LONG-TERM CURRENT USE OF INSULIN: Primary | Chronic | ICD-10-CM

## 2025-05-27 DIAGNOSIS — E78.5 HYPERLIPIDEMIA, UNSPECIFIED HYPERLIPIDEMIA TYPE: Chronic | ICD-10-CM

## 2025-05-27 DIAGNOSIS — E66.812 CLASS 2 SEVERE OBESITY DUE TO EXCESS CALORIES WITH SERIOUS COMORBIDITY AND BODY MASS INDEX (BMI) OF 35.0 TO 35.9 IN ADULT: Chronic | ICD-10-CM

## 2025-05-27 DIAGNOSIS — F17.200 CURRENT EVERY DAY SMOKER: ICD-10-CM

## 2025-05-27 DIAGNOSIS — K21.9 GASTROESOPHAGEAL REFLUX DISEASE, UNSPECIFIED WHETHER ESOPHAGITIS PRESENT: Chronic | ICD-10-CM

## 2025-05-27 PROBLEM — R73.03 PREDIABETES: Status: RESOLVED | Noted: 2023-05-08 | Resolved: 2025-05-27

## 2025-05-27 PROCEDURE — 3080F DIAST BP >= 90 MM HG: CPT | Mod: ,,, | Performed by: NURSE PRACTITIONER

## 2025-05-27 PROCEDURE — 3051F HG A1C>EQUAL 7.0%<8.0%: CPT | Mod: ,,, | Performed by: NURSE PRACTITIONER

## 2025-05-27 PROCEDURE — 99214 OFFICE O/P EST MOD 30 MIN: CPT | Mod: ,,, | Performed by: NURSE PRACTITIONER

## 2025-05-27 PROCEDURE — 3077F SYST BP >= 140 MM HG: CPT | Mod: ,,, | Performed by: NURSE PRACTITIONER

## 2025-05-27 PROCEDURE — 3008F BODY MASS INDEX DOCD: CPT | Mod: ,,, | Performed by: NURSE PRACTITIONER

## 2025-05-27 PROCEDURE — 1159F MED LIST DOCD IN RCRD: CPT | Mod: ,,, | Performed by: NURSE PRACTITIONER

## 2025-05-27 PROCEDURE — 1160F RVW MEDS BY RX/DR IN RCRD: CPT | Mod: ,,, | Performed by: NURSE PRACTITIONER

## 2025-05-27 RX ORDER — SEMAGLUTIDE 0.68 MG/ML
INJECTION, SOLUTION SUBCUTANEOUS
Qty: 9 ML | Refills: 0 | Status: SHIPPED | OUTPATIENT
Start: 2025-05-27

## 2025-05-27 RX ORDER — OMEPRAZOLE 20 MG/1
20 CAPSULE, DELAYED RELEASE ORAL DAILY
Qty: 90 CAPSULE | Refills: 1 | Status: SHIPPED | OUTPATIENT
Start: 2025-05-27

## 2025-05-27 RX ORDER — INSULIN PUMP SYRINGE, 3 ML
EACH MISCELLANEOUS
Qty: 1 EACH | Refills: 0 | Status: SHIPPED | OUTPATIENT
Start: 2025-05-27 | End: 2026-05-27

## 2025-05-27 RX ORDER — ATORVASTATIN CALCIUM 10 MG/1
10 TABLET, FILM COATED ORAL DAILY
Qty: 90 TABLET | Refills: 3 | Status: SHIPPED | OUTPATIENT
Start: 2025-05-27

## 2025-05-27 NOTE — PATIENT INSTRUCTIONS
Start Lipitor 10 mg at bedtime for cholesterol   Start Prilosec for acid reflux     Start Ozempic 0.25 mg one time weekly for four weeks then increase to 0.5 mg one time weekly for diabetes- when you  from pharmacy, bring by clinic and we will assist you with administration of first dose     Increase water intake, try to get at least 64 ounces daily   Ensure to get adequate protein intake while taking Ozempic, recommended daily intake is about 60 grams per day. You can use protein shakes to help get in enough protein each day   Limit processed food, foods high in concentrated sugar, soft drinks   There may be some nausea when you start medication; this should not be a constant nausea/vomiting, if you are experiencing constant nausea with vomiting you will need to come in or contact us here at clinic     Bring blood pressure log back by clinic to be scanned into health record     Check blood pressure in the am and again in the afternoon, keep written log and bring in to follow up visit   Check  and document glucose fasting when you wake up before you eat and 2 hours after meal and bring bg log in to all clinic visit    Blood glucose   Fasting goal   Post prandial (two hours after eating)  goal <180  A1c goal <7  Recommend 45-60 grams of carbohydrates per meal   Follow plate method, avoiding sugar sweetened drinks and fruit juice; avoid foods high in concentrated sugars   Do not skip meals  Exercise 30 minutes daily for at least 5 days per week

## 2025-05-27 NOTE — PROGRESS NOTES
Clinic note     Patient name: Sophy Orozco is a 34 y.o. female   Chief compliant   Chief Complaint   Patient presents with    Follow-up     Pt here for follow up on lab results, pt reports she left her BP log at home today but states bp readings have been 130s/80s range.       Subjective     History of present illness   In clinic to follow up on results of lab obtained on 2025  She states home blood pressure readings have been good, she forgot to bring paper log in to clinic today, she will bring this back by clinic later to be scanned into record   Lab results reviewed and discussed, all questions and concerns addressed at this time     Past Medical History: prediabetes, HTN, HLD, GERD, depression, anxiety    Last A1c 7.7  Will send rx for glucometer and testing supplies today; instructed to check BID, fasting and two hour post prandial      Metformin not tolerated due to severe n/v and diarrhea     Will start Lipitor 10 mg daily, dosing and possible side effects discussed   She denies any history or family history of thyroid cancer or MENs; denies any history of pancreatitis or gastroparesis   Will trial Ozempic; dosing and possible side effects discussed; dietary recommendations discussed                Social History[1]    Review of patient's allergies indicates:   Allergen Reactions    Clindamycin Hives and Rash     swelling       Past Medical History:   Diagnosis Date    Anxiety     Depression     Depression 10/11/2021    Essential hypertension 10/11/2021    Gastroesophageal reflux disease 10/11/2021    History of ovarian cyst     Hyperlipidemia 2023    Hypertension     Prediabetes 2023    Type 2 diabetes mellitus without complication, without long-term current use of insulin 2025       Past Surgical History:   Procedure Laterality Date     SECTION       x 1    CHOLECYSTECTOMY      LAPAROSCOPIC SALPINGO-OOPHORECTOMY Bilateral 2021    Procedure:  "SALPINGO-OOPHORECTOMY, LAPAROSCOPIC;  Surgeon: Bryce Juares MD;  Location: [facility] OR;  Service: OB/GYN;  Laterality: Bilateral;    LAPAROSCOPIC TOTAL HYSTERECTOMY Bilateral 9/1/2021    Procedure: HYSTERECTOMY, TOTAL VAGINAL LAPAROSCOPIC;  Surgeon: Bryce Juares MD;  Location: [facility] OR;  Service: OB/GYN;  Laterality: Bilateral;  vaginal - vpath     TUBAL LIGATION  12/2014        Family History   Problem Relation Name Age of Onset    Ovarian cancer Maternal Grandmother Jazmin     Hypertension Father Stewart     Diabetes Father Stewart     Heart disease Father Stewart     Atrial fibrillation Father Stewart     Diabetes Mother Doris     Hypertension Mother Doris     Heart disease Mother Doris     Breast cancer Son      Breast cancer Son         Current Medications[2]    Review of Systems   Constitutional:  Negative for appetite change, chills, fatigue, fever and unexpected weight change.   Respiratory:  Negative for cough and shortness of breath.    Cardiovascular:  Negative for chest pain, palpitations and leg swelling.   Gastrointestinal:  Positive for reflux. Negative for abdominal pain, change in bowel habit, constipation, diarrhea, nausea and vomiting.   Genitourinary:  Negative for dysuria and frequency.   Musculoskeletal:  Negative for arthralgias, gait problem and myalgias.   Neurological:  Negative for dizziness, syncope, light-headedness and headaches.   Psychiatric/Behavioral:  Negative for dysphoric mood and sleep disturbance. The patient is not nervous/anxious.        Objective     BP (!) 151/96 Comment: BP recheck  Pulse 90   Resp 13   Ht 5' 11" (1.803 m)   Wt 115.8 kg (255 lb 4.8 oz)   LMP 08/25/2021 (Exact Date)   SpO2 99%   BMI 35.61 kg/m²     Physical Exam   Constitutional: She is oriented to person, place, and time. She appears obese. No distress.   HENT:   Head: Atraumatic.   Mouth/Throat: Mucous membranes are moist.   Cardiovascular: Normal rate and regular rhythm. " Pulmonary:      Effort: Pulmonary effort is normal. No respiratory distress.      Breath sounds: Normal breath sounds. No wheezing, rhonchi or rales.     Abdominal: Soft. Bowel sounds are normal. She exhibits no distension. There is no abdominal tenderness.   Musculoskeletal:         General: Normal range of motion.      Cervical back: Neck supple.      Right lower leg: No edema.      Left lower leg: No edema.   Neurological: She is alert and oriented to person, place, and time. Gait normal.   Skin: Skin is warm and dry.   Psychiatric: Her behavior is normal. Mood normal.       Lab Results   Component Value Date    WBC 7.47 05/19/2025    HGB 14.2 05/19/2025    HCT 43.6 05/19/2025    MCV 86.0 05/19/2025     05/19/2025       CMP  Sodium   Date Value Ref Range Status   05/19/2025 139 136 - 145 mmol/L Final     Potassium   Date Value Ref Range Status   05/19/2025 4.3 3.5 - 5.1 mmol/L Final     Chloride   Date Value Ref Range Status   05/19/2025 107 98 - 107 mmol/L Final     CO2   Date Value Ref Range Status   05/19/2025 27 22 - 29 mmol/L Final     Glucose   Date Value Ref Range Status   05/19/2025 151 (H) 74 - 100 mg/dL Final     BUN   Date Value Ref Range Status   05/19/2025 8 7 - 19 mg/dL Final     Creatinine   Date Value Ref Range Status   05/19/2025 0.87 0.55 - 1.02 mg/dL Final     Calcium   Date Value Ref Range Status   05/19/2025 9.0 8.4 - 10.2 mg/dL Final     Total Protein   Date Value Ref Range Status   05/19/2025 6.9 6.4 - 8.3 g/dL Final     Albumin   Date Value Ref Range Status   05/19/2025 3.5 3.5 - 5.0 g/dL Final     Bilirubin, Total   Date Value Ref Range Status   05/19/2025 0.4 <=1.5 mg/dL Final     Alk Phos   Date Value Ref Range Status   05/19/2025 85 40 - 150 U/L Final     AST   Date Value Ref Range Status   05/19/2025 22 11 - 45 U/L Final     ALT   Date Value Ref Range Status   05/19/2025 25 <=55 U/L Final     Anion Gap   Date Value Ref Range Status   05/19/2025 9 7 - 16 mmol/L Final     eGFR    Date Value Ref Range Status   10/11/2021 71 >=60 mL/min/1.73m² Final     Lab Results   Component Value Date    TSH 1.386 05/19/2025     Lab Results   Component Value Date    CHOL 175 05/19/2025    CHOL 180 10/18/2023    CHOL 189 10/26/2022     Lab Results   Component Value Date    HDL 29 (L) 05/19/2025    HDL 48 10/18/2023    HDL 35 (L) 10/26/2022     Lab Results   Component Value Date    LDLCALC 126 05/19/2025    LDLCALC 117 10/18/2023    LDLCALC 128 10/26/2022     Lab Results   Component Value Date    TRIG 102 05/19/2025    TRIG 75 10/18/2023    TRIG 130 10/26/2022     Lab Results   Component Value Date    CHOLHDL 6.0 05/19/2025    CHOLHDL 3.8 10/18/2023    CHOLHDL 5.4 10/26/2022     Lab Results   Component Value Date    HGBA1C 7.7 (H) 05/19/2025       Lab Results   Component Value Date    [id number] Negative 08/31/2021     Any diagnostic testing results obtained in office or prior to appointment were reviewed were reviewed with patient.    Health Maintenance Due   Topic Date Due    Hepatitis C Screening  Never done    HIV Screening  Never done    TETANUS VACCINE  Never done    COVID-19 Vaccine (1 - 2024-25 season) Never done         Health Maintenance Topics with due status: Not Due       Topic Last Completion Date    Influenza Vaccine 10/26/2022    Pap Smear 10/17/2023    Hemoglobin A1c (Prediabetes) 05/19/2025    RSV Vaccine (Age 60+ and Pregnant patients) Not Due         Assessment and Plan   Type 2 diabetes mellitus without complication, without long-term current use of insulin  -     semaglutide (OZEMPIC) 0.25 mg or 0.5 mg (2 mg/3 mL) pen injector; Inject 0.25 mg SC one time weekly for four weeks then increase to 0.5 mg SC one time weekly  Dispense: 9 mL; Refill: 0  -     blood-glucose meter kit; Use as instructed to check blood glucose  Dispense: 1 each; Refill: 0  -     blood sugar diagnostic Strp; Use one test strip to check blood glucose BID  Dispense: 200 strip; Refill: 3    Essential  hypertension    Hyperlipidemia, unspecified hyperlipidemia type  -     atorvastatin (LIPITOR) 10 MG tablet; Take 1 tablet (10 mg total) by mouth once daily.  Dispense: 90 tablet; Refill: 3    Class 2 severe obesity due to excess calories with serious comorbidity and body mass index (BMI) of 35.0 to 35.9 in adult    Current every day smoker    Gastroesophageal reflux disease, unspecified whether esophagitis present  -     omeprazole (PRILOSEC) 20 MG capsule; Take 1 capsule (20 mg total) by mouth once daily.  Dispense: 90 capsule; Refill: 1          Patient Instructions  Patient Instructions   Start Lipitor 10 mg at bedtime for cholesterol   Start Prilosec for acid reflux     Start Ozempic 0.25 mg one time weekly for four weeks then increase to 0.5 mg one time weekly for diabetes- when you  from pharmacy, bring by clinic and we will assist you with administration of first dose     Increase water intake, try to get at least 64 ounces daily   Ensure to get adequate protein intake while taking Ozempic, recommended daily intake is about 60 grams per day. You can use protein shakes to help get in enough protein each day   Limit processed food, foods high in concentrated sugar, soft drinks   There may be some nausea when you start medication; this should not be a constant nausea/vomiting, if you are experiencing constant nausea with vomiting you will need to come in or contact us here at clinic     Bring blood pressure log back by clinic to be scanned into health record     Check blood pressure in the am and again in the afternoon, keep written log and bring in to follow up visit   Check  and document glucose fasting when you wake up before you eat and 2 hours after meal and bring bg log in to all clinic visit    Blood glucose   Fasting goal   Post prandial (two hours after eating)  goal <180  A1c goal <7  Recommend 45-60 grams of carbohydrates per meal   Follow plate method, avoiding sugar sweetened drinks and  fruit juice; avoid foods high in concentrated sugars   Do not skip meals  Exercise 30 minutes daily for at least 5 days per week                                      [1]   Social History  Tobacco Use    Smoking status: Every Day     Current packs/day: 0.50     Average packs/day: 0.5 packs/day for 10.4 years (5.2 ttl pk-yrs)     Types: Cigarettes     Start date: 2015     Passive exposure: Current    Smokeless tobacco: Never    Tobacco comments:     Quit smoking 2 months ago.   Substance Use Topics    Alcohol use: Yes     Comment: Social    Drug use: Yes     Types: Marijuana   [2]   Current Outpatient Medications:     atorvastatin (LIPITOR) 10 MG tablet, Take 1 tablet (10 mg total) by mouth once daily., Disp: 90 tablet, Rfl: 3    blood sugar diagnostic Strp, Use one test strip to check blood glucose BID, Disp: 200 strip, Rfl: 3    blood-glucose meter kit, Use as instructed to check blood glucose, Disp: 1 each, Rfl: 0    EScitalopram oxalate (LEXAPRO) 10 MG tablet, Take 1 tablet (10 mg total) by mouth once daily., Disp: 90 tablet, Rfl: 0    estrogens, conjugated, (PREMARIN) 0.625 MG tablet, Take 1 tablet (0.625 mg total) by mouth once daily., Disp: 30 tablet, Rfl: 11    omeprazole (PRILOSEC) 20 MG capsule, Take 1 capsule (20 mg total) by mouth once daily., Disp: 90 capsule, Rfl: 1    semaglutide (OZEMPIC) 0.25 mg or 0.5 mg (2 mg/3 mL) pen injector, Inject 0.25 mg SC one time weekly for four weeks then increase to 0.5 mg SC one time weekly, Disp: 9 mL, Rfl: 0

## 2025-05-28 ENCOUNTER — PATIENT MESSAGE (OUTPATIENT)
Dept: FAMILY MEDICINE | Facility: CLINIC | Age: 35
End: 2025-05-28
Payer: COMMERCIAL

## 2025-06-02 ENCOUNTER — OFFICE VISIT (OUTPATIENT)
Dept: OBSTETRICS AND GYNECOLOGY | Facility: CLINIC | Age: 35
End: 2025-06-02
Payer: COMMERCIAL

## 2025-06-02 VITALS
BODY MASS INDEX: 34.84 KG/M2 | DIASTOLIC BLOOD PRESSURE: 91 MMHG | SYSTOLIC BLOOD PRESSURE: 125 MMHG | WEIGHT: 248.88 LBS | HEART RATE: 69 BPM | HEIGHT: 71 IN

## 2025-06-02 DIAGNOSIS — Z79.890 HORMONE REPLACEMENT THERAPY: ICD-10-CM

## 2025-06-02 DIAGNOSIS — F17.210 CIGARETTE SMOKER ONE HALF PACK A DAY OR LESS: Primary | ICD-10-CM

## 2025-06-02 DIAGNOSIS — N95.1 MENOPAUSAL SYMPTOM: ICD-10-CM

## 2025-06-02 DIAGNOSIS — R61 NIGHT SWEATS: ICD-10-CM

## 2025-06-02 PROCEDURE — 1159F MED LIST DOCD IN RCRD: CPT | Mod: ,,, | Performed by: ADVANCED PRACTICE MIDWIFE

## 2025-06-02 PROCEDURE — 3080F DIAST BP >= 90 MM HG: CPT | Mod: ,,, | Performed by: ADVANCED PRACTICE MIDWIFE

## 2025-06-02 PROCEDURE — 3074F SYST BP LT 130 MM HG: CPT | Mod: ,,, | Performed by: ADVANCED PRACTICE MIDWIFE

## 2025-06-02 PROCEDURE — 3008F BODY MASS INDEX DOCD: CPT | Mod: ,,, | Performed by: ADVANCED PRACTICE MIDWIFE

## 2025-06-02 PROCEDURE — 3051F HG A1C>EQUAL 7.0%<8.0%: CPT | Mod: ,,, | Performed by: ADVANCED PRACTICE MIDWIFE

## 2025-06-02 PROCEDURE — 99213 OFFICE O/P EST LOW 20 MIN: CPT | Mod: ,,, | Performed by: ADVANCED PRACTICE MIDWIFE

## 2025-06-05 DIAGNOSIS — E11.9 TYPE 2 DIABETES MELLITUS WITHOUT COMPLICATION, WITHOUT LONG-TERM CURRENT USE OF INSULIN: Primary | Chronic | ICD-10-CM

## 2025-06-05 RX ORDER — DAPAGLIFLOZIN 10 MG/1
10 TABLET, FILM COATED ORAL DAILY
Qty: 90 TABLET | Refills: 0 | Status: SHIPPED | OUTPATIENT
Start: 2025-06-05

## 2025-06-20 ENCOUNTER — PATIENT OUTREACH (OUTPATIENT)
Facility: HOSPITAL | Age: 35
End: 2025-06-20

## 2025-06-20 NOTE — PROGRESS NOTES
Population Health Review...  Per [facility] website, insurance is active and pt is listed on the attributed list needing a healthy you performed in 2025  HY scheduled for 7/31/2025

## 2025-06-25 ENCOUNTER — OFFICE VISIT (OUTPATIENT)
Dept: FAMILY MEDICINE | Facility: CLINIC | Age: 35
End: 2025-06-25
Payer: COMMERCIAL

## 2025-06-25 VITALS
HEIGHT: 71 IN | WEIGHT: 251 LBS | BODY MASS INDEX: 35.14 KG/M2 | DIASTOLIC BLOOD PRESSURE: 88 MMHG | OXYGEN SATURATION: 98 % | HEART RATE: 78 BPM | SYSTOLIC BLOOD PRESSURE: 125 MMHG

## 2025-06-25 DIAGNOSIS — I10 ESSENTIAL HYPERTENSION: Chronic | ICD-10-CM

## 2025-06-25 DIAGNOSIS — E11.9 TYPE 2 DIABETES MELLITUS WITHOUT COMPLICATION, WITHOUT LONG-TERM CURRENT USE OF INSULIN: Primary | Chronic | ICD-10-CM

## 2025-06-25 DIAGNOSIS — E66.01 CLASS 2 SEVERE OBESITY DUE TO EXCESS CALORIES WITH SERIOUS COMORBIDITY AND BODY MASS INDEX (BMI) OF 35.0 TO 35.9 IN ADULT: Chronic | ICD-10-CM

## 2025-06-25 DIAGNOSIS — E66.812 CLASS 2 SEVERE OBESITY DUE TO EXCESS CALORIES WITH SERIOUS COMORBIDITY AND BODY MASS INDEX (BMI) OF 35.0 TO 35.9 IN ADULT: Chronic | ICD-10-CM

## 2025-06-25 DIAGNOSIS — F17.200 CURRENT EVERY DAY SMOKER: ICD-10-CM

## 2025-06-25 PROCEDURE — 3079F DIAST BP 80-89 MM HG: CPT | Mod: ,,, | Performed by: NURSE PRACTITIONER

## 2025-06-25 PROCEDURE — 3074F SYST BP LT 130 MM HG: CPT | Mod: ,,, | Performed by: NURSE PRACTITIONER

## 2025-06-25 PROCEDURE — 1160F RVW MEDS BY RX/DR IN RCRD: CPT | Mod: ,,, | Performed by: NURSE PRACTITIONER

## 2025-06-25 PROCEDURE — 3051F HG A1C>EQUAL 7.0%<8.0%: CPT | Mod: ,,, | Performed by: NURSE PRACTITIONER

## 2025-06-25 PROCEDURE — 3008F BODY MASS INDEX DOCD: CPT | Mod: ,,, | Performed by: NURSE PRACTITIONER

## 2025-06-25 PROCEDURE — 99214 OFFICE O/P EST MOD 30 MIN: CPT | Mod: ,,, | Performed by: NURSE PRACTITIONER

## 2025-06-25 PROCEDURE — 1159F MED LIST DOCD IN RCRD: CPT | Mod: ,,, | Performed by: NURSE PRACTITIONER

## 2025-06-25 RX ORDER — GLIMEPIRIDE 1 MG/1
1 TABLET ORAL
Qty: 90 TABLET | Refills: 0 | Status: SHIPPED | OUTPATIENT
Start: 2025-06-25

## 2025-06-25 NOTE — PATIENT INSTRUCTIONS
Follow up on 8/14/2025 and as needed   Medication will be due again on 8/17/25    Start glimepiride 1 mg take one tablet daily     Blood glucose   Fasting goal   Post prandial (two hours after eating)  goal <180  A1c goal <7  Recommend 45-60 grams of carbohydrates per meal   Follow plate method, avoiding sugar sweetened drinks and fruit juice; avoid foods high in concentrated sugars   Do not skip meals  Exercise 30 minutes daily for at least 5 days per week       Staff will call you in about one week to see how you are doing, if you are tolerating glimepiride without problems we will then start losartan 25 mg daily as discussed

## 2025-06-25 NOTE — PROGRESS NOTES
Clinic note     Patient name: Sophy Orozco is a 34 y.o. female   Chief compliant   Chief Complaint   Patient presents with    Follow-up     Can't afford current Dm meds due to deductible. Has bp logs with her.        Subjective     History of present illness   In clinic for follow up on DM after starting Farxiga last office visit; she was not able to  medication due to copay/deductible on insurance  Tried to get Ozempic but medication copay was not affordable     Past Medical History: prediabetes, HTN, HLD, GERD, depression, anxiety     Last A1c 7.7  Checking blood glucose  Blood glucose log  Glucometer:     Blood pressure log reviewed and will be scanned into record, readings range 150//90 over the last seven days       Metformin not tolerated due to severe n/v and diarrhea   Will trial glimepiride 1 mg daily, dosing and possible side effects reviewed                Social History[1]    Review of patient's allergies indicates:   Allergen Reactions    Clindamycin Hives and Rash     swelling       Past Medical History:   Diagnosis Date    Anxiety     Depression     Depression 10/11/2021    Essential hypertension 10/11/2021    Gastroesophageal reflux disease 10/11/2021    History of ovarian cyst     Hyperlipidemia 2023    Hypertension     Prediabetes 2023    Type 2 diabetes mellitus without complication, without long-term current use of insulin 2025       Past Surgical History:   Procedure Laterality Date     SECTION       x 1    CHOLECYSTECTOMY      LAPAROSCOPIC SALPINGO-OOPHORECTOMY Bilateral 2021    Procedure: SALPINGO-OOPHORECTOMY, LAPAROSCOPIC;  Surgeon: Bryce Juraes MD;  Location: [facility] OR;  Service: OB/GYN;  Laterality: Bilateral;    LAPAROSCOPIC TOTAL HYSTERECTOMY Bilateral 2021    Procedure: HYSTERECTOMY, TOTAL VAGINAL LAPAROSCOPIC;  Surgeon: Bryce Juares MD;  Location: [facility] OR;  Service: OB/GYN;  Laterality: Bilateral;   "vaginal - vpath     TUBAL LIGATION  12/2014        Family History   Problem Relation Name Age of Onset    Diabetes Mother Doris     Hypertension Mother Doris     Heart disease Mother Doris     Hypertension Father Stewart     Diabetes Father Stewart     Heart disease Father Stewart     Atrial fibrillation Father Stewart     Ovarian cancer Maternal Grandmother Jazmin        Current Medications[2]    Review of Systems   Constitutional:  Negative for appetite change, chills, fatigue, fever and unexpected weight change.   Respiratory:  Negative for cough and shortness of breath.    Cardiovascular:  Negative for chest pain, palpitations and leg swelling.   Gastrointestinal:  Negative for abdominal pain, change in bowel habit, constipation, diarrhea, nausea and vomiting.   Genitourinary:  Negative for dysuria and frequency.   Musculoskeletal:  Negative for arthralgias, gait problem and myalgias.   Neurological:  Negative for dizziness, syncope, light-headedness and headaches.   Psychiatric/Behavioral:  Negative for dysphoric mood and sleep disturbance. The patient is not nervous/anxious.        Objective     /88 (BP Location: Right arm, Patient Position: Sitting)   Pulse 78   Ht 5' 11" (1.803 m)   Wt 113.9 kg (251 lb)   LMP 08/25/2021 (Exact Date)   SpO2 98%   BMI 35.01 kg/m²     Physical Exam   Constitutional: She is oriented to person, place, and time. She appears obese. No distress.   HENT:   Head: Atraumatic.   Mouth/Throat: Mucous membranes are moist.   Cardiovascular: Normal rate and regular rhythm. Pulmonary:      Effort: Pulmonary effort is normal. No respiratory distress.      Breath sounds: Normal breath sounds. No wheezing, rhonchi or rales.     Abdominal: Soft. Bowel sounds are normal. She exhibits no distension. There is no abdominal tenderness.   Musculoskeletal:         General: Normal range of motion.      Cervical back: Neck supple.      Right lower leg: No edema.      Left lower leg: No edema. "   Neurological: She is alert and oriented to person, place, and time. Gait normal.   Skin: Skin is warm and dry.   Psychiatric: Her behavior is normal. Mood normal.       Lab Results   Component Value Date    WBC 7.47 05/19/2025    HGB 14.2 05/19/2025    HCT 43.6 05/19/2025    MCV 86.0 05/19/2025     05/19/2025       CMP  Sodium   Date Value Ref Range Status   05/19/2025 139 136 - 145 mmol/L Final     Potassium   Date Value Ref Range Status   05/19/2025 4.3 3.5 - 5.1 mmol/L Final     Chloride   Date Value Ref Range Status   05/19/2025 107 98 - 107 mmol/L Final     CO2   Date Value Ref Range Status   05/19/2025 27 22 - 29 mmol/L Final     Glucose   Date Value Ref Range Status   05/19/2025 151 (H) 74 - 100 mg/dL Final     BUN   Date Value Ref Range Status   05/19/2025 8 7 - 19 mg/dL Final     Creatinine   Date Value Ref Range Status   05/19/2025 0.87 0.55 - 1.02 mg/dL Final     Calcium   Date Value Ref Range Status   05/19/2025 9.0 8.4 - 10.2 mg/dL Final     Total Protein   Date Value Ref Range Status   05/19/2025 6.9 6.4 - 8.3 g/dL Final     Albumin   Date Value Ref Range Status   05/19/2025 3.5 3.5 - 5.0 g/dL Final     Bilirubin, Total   Date Value Ref Range Status   05/19/2025 0.4 <=1.5 mg/dL Final     Alk Phos   Date Value Ref Range Status   05/19/2025 85 40 - 150 U/L Final     AST   Date Value Ref Range Status   05/19/2025 22 11 - 45 U/L Final     ALT   Date Value Ref Range Status   05/19/2025 25 <=55 U/L Final     Anion Gap   Date Value Ref Range Status   05/19/2025 9 7 - 16 mmol/L Final     eGFR   Date Value Ref Range Status   10/11/2021 71 >=60 mL/min/1.73m² Final     Lab Results   Component Value Date    TSH 1.386 05/19/2025     Lab Results   Component Value Date    CHOL 175 05/19/2025    CHOL 180 10/18/2023    CHOL 189 10/26/2022     Lab Results   Component Value Date    HDL 29 (L) 05/19/2025    HDL 48 10/18/2023    HDL 35 (L) 10/26/2022     Lab Results   Component Value Date    LDLCALC 126 05/19/2025     LDLCALC 117 10/18/2023    LDLCALC 128 10/26/2022     Lab Results   Component Value Date    TRIG 102 05/19/2025    TRIG 75 10/18/2023    TRIG 130 10/26/2022     Lab Results   Component Value Date    CHOLHDL 6.0 05/19/2025    CHOLHDL 3.8 10/18/2023    CHOLHDL 5.4 10/26/2022     Lab Results   Component Value Date    HGBA1C 7.7 (H) 05/19/2025       Lab Results   Component Value Date    [id number] Negative 08/31/2021     Any diagnostic testing results obtained in office or prior to appointment were reviewed were reviewed with patient.    Health Maintenance Due   Topic Date Due    Hepatitis C Screening  Never done    Diabetes Urine Screening  Never done    Foot Exam  Never done    Diabetic Eye Exam  Never done    HIV Screening  Never done    TETANUS VACCINE  Never done    COVID-19 Vaccine (1 - 2024-25 season) Never done         Health Maintenance Topics with due status: Not Due       Topic Last Completion Date    Influenza Vaccine 10/26/2022    Pap Smear 10/17/2023    Lipid Panel 05/19/2025    Hemoglobin A1c 05/19/2025    RSV Vaccine (Age 60+ and Pregnant patients) Not Due         Assessment and Plan   Type 2 diabetes mellitus without complication, without long-term current use of insulin  -     glimepiride (AMARYL) 1 MG tablet; Take 1 tablet (1 mg total) by mouth before breakfast.  Dispense: 90 tablet; Refill: 0    Essential hypertension    Class 2 severe obesity due to excess calories with serious comorbidity and body mass index (BMI) of 35.0 to 35.9 in adult    Current every day smoker          Patient Instructions  Patient Instructions   Follow up on 8/14/2025 and as needed   Medication will be due again on 8/17/25    Start glimepiride 1 mg take one tablet daily     Blood glucose   Fasting goal   Post prandial (two hours after eating)  goal <180  A1c goal <7  Recommend 45-60 grams of carbohydrates per meal   Follow plate method, avoiding sugar sweetened drinks and fruit juice; avoid foods high in  concentrated sugars   Do not skip meals  Exercise 30 minutes daily for at least 5 days per week       Staff will call you in about one week to see how you are doing, if you are tolerating glimepiride without problems we will then start losartan 25 mg daily as discussed                                  [1]   Social History  Tobacco Use    Smoking status: Every Day     Current packs/day: 0.50     Average packs/day: 0.5 packs/day for 10.5 years (5.2 ttl pk-yrs)     Types: Cigarettes     Start date: 2015     Passive exposure: Current    Smokeless tobacco: Never    Tobacco comments:     Quit smoking 2 months ago.   Substance Use Topics    Alcohol use: Yes     Comment: Social    Drug use: Yes     Types: Marijuana   [2]   Current Outpatient Medications:     atorvastatin (LIPITOR) 10 MG tablet, Take 1 tablet (10 mg total) by mouth once daily., Disp: 90 tablet, Rfl: 3    EScitalopram oxalate (LEXAPRO) 10 MG tablet, Take 1 tablet (10 mg total) by mouth once daily., Disp: 90 tablet, Rfl: 0    omeprazole (PRILOSEC) 20 MG capsule, Take 1 capsule (20 mg total) by mouth once daily., Disp: 90 capsule, Rfl: 1    blood sugar diagnostic Strp, Use one test strip to check blood glucose BID, Disp: 200 strip, Rfl: 3    blood-glucose meter kit, Use as instructed to check blood glucose, Disp: 1 each, Rfl: 0    estrogens, conjugated, (PREMARIN) 0.625 MG tablet, Take 1 tablet (0.625 mg total) by mouth once daily. (Patient not taking: Reported on 6/25/2025), Disp: 30 tablet, Rfl: 11    glimepiride (AMARYL) 1 MG tablet, Take 1 tablet (1 mg total) by mouth before breakfast., Disp: 90 tablet, Rfl: 0

## 2025-07-31 ENCOUNTER — OFFICE VISIT (OUTPATIENT)
Dept: FAMILY MEDICINE | Facility: CLINIC | Age: 35
End: 2025-07-31
Payer: COMMERCIAL

## 2025-07-31 VITALS
BODY MASS INDEX: 35.56 KG/M2 | DIASTOLIC BLOOD PRESSURE: 82 MMHG | HEIGHT: 71 IN | SYSTOLIC BLOOD PRESSURE: 138 MMHG | HEART RATE: 57 BPM | WEIGHT: 254 LBS | OXYGEN SATURATION: 99 %

## 2025-07-31 DIAGNOSIS — Z13.220 SCREENING FOR HYPERLIPIDEMIA: ICD-10-CM

## 2025-07-31 DIAGNOSIS — I10 ESSENTIAL HYPERTENSION: Chronic | ICD-10-CM

## 2025-07-31 DIAGNOSIS — F17.200 CURRENT EVERY DAY SMOKER: ICD-10-CM

## 2025-07-31 DIAGNOSIS — E11.9 TYPE 2 DIABETES MELLITUS WITHOUT COMPLICATION, WITHOUT LONG-TERM CURRENT USE OF INSULIN: Chronic | ICD-10-CM

## 2025-07-31 DIAGNOSIS — E66.01 CLASS 2 SEVERE OBESITY DUE TO EXCESS CALORIES WITH SERIOUS COMORBIDITY AND BODY MASS INDEX (BMI) OF 35.0 TO 35.9 IN ADULT: Chronic | ICD-10-CM

## 2025-07-31 DIAGNOSIS — F32.A DEPRESSION, UNSPECIFIED DEPRESSION TYPE: Chronic | ICD-10-CM

## 2025-07-31 DIAGNOSIS — E78.5 HYPERLIPIDEMIA, UNSPECIFIED HYPERLIPIDEMIA TYPE: Chronic | ICD-10-CM

## 2025-07-31 DIAGNOSIS — Z13.1 SCREENING FOR DIABETES MELLITUS: ICD-10-CM

## 2025-07-31 DIAGNOSIS — E66.812 CLASS 2 SEVERE OBESITY DUE TO EXCESS CALORIES WITH SERIOUS COMORBIDITY AND BODY MASS INDEX (BMI) OF 35.0 TO 35.9 IN ADULT: Chronic | ICD-10-CM

## 2025-07-31 DIAGNOSIS — F41.9 ANXIETY: Chronic | ICD-10-CM

## 2025-07-31 DIAGNOSIS — Z00.00 ROUTINE GENERAL MEDICAL EXAMINATION AT A HEALTH CARE FACILITY: Primary | ICD-10-CM

## 2025-07-31 DIAGNOSIS — K21.9 GASTROESOPHAGEAL REFLUX DISEASE, UNSPECIFIED WHETHER ESOPHAGITIS PRESENT: Chronic | ICD-10-CM

## 2025-07-31 PROCEDURE — 3051F HG A1C>EQUAL 7.0%<8.0%: CPT | Mod: ,,, | Performed by: NURSE PRACTITIONER

## 2025-07-31 PROCEDURE — 3075F SYST BP GE 130 - 139MM HG: CPT | Mod: ,,, | Performed by: NURSE PRACTITIONER

## 2025-07-31 PROCEDURE — 4010F ACE/ARB THERAPY RXD/TAKEN: CPT | Mod: ,,, | Performed by: NURSE PRACTITIONER

## 2025-07-31 PROCEDURE — 3079F DIAST BP 80-89 MM HG: CPT | Mod: ,,, | Performed by: NURSE PRACTITIONER

## 2025-07-31 PROCEDURE — 3008F BODY MASS INDEX DOCD: CPT | Mod: ,,, | Performed by: NURSE PRACTITIONER

## 2025-07-31 PROCEDURE — 1159F MED LIST DOCD IN RCRD: CPT | Mod: ,,, | Performed by: NURSE PRACTITIONER

## 2025-07-31 PROCEDURE — 1160F RVW MEDS BY RX/DR IN RCRD: CPT | Mod: ,,, | Performed by: NURSE PRACTITIONER

## 2025-07-31 PROCEDURE — 99395 PREV VISIT EST AGE 18-39: CPT | Mod: ,,, | Performed by: NURSE PRACTITIONER

## 2025-07-31 RX ORDER — OMEPRAZOLE 20 MG/1
20 CAPSULE, DELAYED RELEASE ORAL DAILY
Qty: 90 CAPSULE | Refills: 0 | Status: SHIPPED | OUTPATIENT
Start: 2025-07-31

## 2025-07-31 RX ORDER — ESCITALOPRAM OXALATE 10 MG/1
10 TABLET ORAL DAILY
Qty: 90 TABLET | Refills: 0 | Status: SHIPPED | OUTPATIENT
Start: 2025-07-31

## 2025-07-31 RX ORDER — GLIMEPIRIDE 1 MG/1
1 TABLET ORAL
Qty: 90 TABLET | Refills: 0 | Status: SHIPPED | OUTPATIENT
Start: 2025-07-31

## 2025-07-31 RX ORDER — LOSARTAN POTASSIUM 25 MG/1
25 TABLET ORAL DAILY
Qty: 90 TABLET | Refills: 0 | Status: SHIPPED | OUTPATIENT
Start: 2025-07-31

## 2025-07-31 NOTE — PATIENT INSTRUCTIONS
A1c will be due 8/18/25, come by clinic at your convenience for this to be obtained   Start Losartan 25 mg daily   Check blood pressure in the am and again in the afternoon, keep written log and bring in when you come for you A1c to be obtained     Refills provided on routine medications       [employer] HY in one year     Routine medication refill and follow up in three months 10/22/2025    Blood glucose   Fasting goal   Post prandial (two hours after eating)  goal <180  A1c goal <7  Recommend 45-60 grams of carbohydrates per meal   Follow plate method, avoiding sugar sweetened drinks and fruit juice; avoid foods high in concentrated sugars   Do not skip meals  Exercise 30 minutes daily for at least 5 days per week

## 2025-07-31 NOTE — PROGRESS NOTES
Clinic note     Patient name: Sophy Orozco is a 34 y.o. female   Chief compliant   Chief Complaint   Patient presents with    Healthy You     Here for healthy you and Dm follow up       Subjective     History of present illness   [employer] HY   Denies any acute concerns at present     Past Medical History: prediabetes, HTN, HLD, GERD, depression, anxiety     Last A1c 7.7, due to recheck after 25  Checking blood glucose daily                 Social History[1]    Review of patient's allergies indicates:   Allergen Reactions    Clindamycin Hives and Rash     swelling       Past Medical History:   Diagnosis Date    Anxiety     Depression     Depression 10/11/2021    Essential hypertension 10/11/2021    Gastroesophageal reflux disease 10/11/2021    History of ovarian cyst     Hyperlipidemia 2023    Hypertension     Prediabetes 2023    Type 2 diabetes mellitus without complication, without long-term current use of insulin 2025       Past Surgical History:   Procedure Laterality Date     SECTION       x 1    CHOLECYSTECTOMY      LAPAROSCOPIC SALPINGO-OOPHORECTOMY Bilateral 2021    Procedure: SALPINGO-OOPHORECTOMY, LAPAROSCOPIC;  Surgeon: Bryce Juares MD;  Location: [facility];  Service: OB/GYN;  Laterality: Bilateral;    LAPAROSCOPIC TOTAL HYSTERECTOMY Bilateral 2021    Procedure: HYSTERECTOMY, TOTAL VAGINAL LAPAROSCOPIC;  Surgeon: Bryce Juares MD;  Location: [facility];  Service: OB/GYN;  Laterality: Bilateral;  vaginal - vpath     TUBAL LIGATION  2014        Family History   Problem Relation Name Age of Onset    Diabetes Mother Doris     Hypertension Mother Doris     Heart disease Mother Doris     Hypertension Father Stewart     Diabetes Father Stewart     Heart disease Father Stewart     Atrial fibrillation Father Stewart     Ovarian cancer Maternal Grandmother Jazmin        Current Medications[2]    Review of Systems   Constitutional:  Negative for  "appetite change, chills, fatigue, fever and unexpected weight change.   Respiratory:  Negative for cough and shortness of breath.    Cardiovascular:  Negative for chest pain, palpitations and leg swelling.   Gastrointestinal:  Negative for abdominal pain, change in bowel habit, constipation, diarrhea, nausea and vomiting.   Genitourinary:  Negative for dysuria and frequency.   Musculoskeletal:  Negative for arthralgias, gait problem and myalgias.   Neurological:  Negative for dizziness, syncope, light-headedness and headaches.   Psychiatric/Behavioral:  Negative for dysphoric mood and sleep disturbance. The patient is not nervous/anxious.        Objective     /82 (BP Location: Left arm, Patient Position: Sitting)   Pulse (!) 57   Ht 5' 11" (1.803 m)   Wt 115.2 kg (254 lb)   LMP 08/25/2021 (Exact Date)   SpO2 99%   BMI 35.43 kg/m²     Physical Exam   Constitutional: She is oriented to person, place, and time. She appears obese. No distress.   HENT:   Head: Atraumatic.   Mouth/Throat: Mucous membranes are moist.   Cardiovascular: Normal rate and regular rhythm. Pulmonary:      Effort: Pulmonary effort is normal. No respiratory distress.      Breath sounds: Normal breath sounds. No wheezing, rhonchi or rales.     Abdominal: Soft. Bowel sounds are normal. She exhibits no distension. There is no abdominal tenderness.   Musculoskeletal:         General: Normal range of motion.      Cervical back: Neck supple.      Right lower leg: No edema.      Left lower leg: No edema.   Neurological: She is alert and oriented to person, place, and time. Gait normal.   Skin: Skin is warm and dry.   Psychiatric: Her behavior is normal. Mood normal.       Lab Results   Component Value Date    WBC 7.47 05/19/2025    HGB 14.2 05/19/2025    HCT 43.6 05/19/2025    MCV 86.0 05/19/2025     05/19/2025       CMP  Sodium   Date Value Ref Range Status   05/19/2025 139 136 - 145 mmol/L Final     Potassium   Date Value Ref Range " Status   05/19/2025 4.3 3.5 - 5.1 mmol/L Final     Chloride   Date Value Ref Range Status   05/19/2025 107 98 - 107 mmol/L Final     CO2   Date Value Ref Range Status   05/19/2025 27 22 - 29 mmol/L Final     Glucose   Date Value Ref Range Status   05/19/2025 151 (H) 74 - 100 mg/dL Final     BUN   Date Value Ref Range Status   05/19/2025 8 7 - 19 mg/dL Final     Creatinine   Date Value Ref Range Status   05/19/2025 0.87 0.55 - 1.02 mg/dL Final     Calcium   Date Value Ref Range Status   05/19/2025 9.0 8.4 - 10.2 mg/dL Final     Total Protein   Date Value Ref Range Status   05/19/2025 6.9 6.4 - 8.3 g/dL Final     Albumin   Date Value Ref Range Status   05/19/2025 3.5 3.5 - 5.0 g/dL Final     Bilirubin, Total   Date Value Ref Range Status   05/19/2025 0.4 <=1.5 mg/dL Final     Alk Phos   Date Value Ref Range Status   05/19/2025 85 40 - 150 U/L Final     AST   Date Value Ref Range Status   05/19/2025 22 11 - 45 U/L Final     ALT   Date Value Ref Range Status   05/19/2025 25 <=55 U/L Final     Anion Gap   Date Value Ref Range Status   05/19/2025 9 7 - 16 mmol/L Final     eGFR   Date Value Ref Range Status   10/11/2021 71 >=60 mL/min/1.73m² Final     Lab Results   Component Value Date    TSH 1.386 05/19/2025     Lab Results   Component Value Date    CHOL 175 05/19/2025    CHOL 180 10/18/2023    CHOL 189 10/26/2022     Lab Results   Component Value Date    HDL 29 (L) 05/19/2025    HDL 48 10/18/2023    HDL 35 (L) 10/26/2022     Lab Results   Component Value Date    LDLCALC 126 05/19/2025    LDLCALC 117 10/18/2023    LDLCALC 128 10/26/2022     Lab Results   Component Value Date    TRIG 102 05/19/2025    TRIG 75 10/18/2023    TRIG 130 10/26/2022     Lab Results   Component Value Date    CHOLHDL 6.0 05/19/2025    CHOLHDL 3.8 10/18/2023    CHOLHDL 5.4 10/26/2022     Lab Results   Component Value Date    HGBA1C 7.7 (H) 05/19/2025       Lab Results   Component Value Date    [id number] Negative 08/31/2021     Any diagnostic  testing results obtained in office or prior to appointment were reviewed were reviewed with patient.    Health Maintenance Due   Topic Date Due    Hepatitis C Screening  Never done    Diabetes Urine Screening  Never done    Foot Exam  Never done    Diabetic Eye Exam  Never done    HIV Screening  Never done    TETANUS VACCINE  Never done    COVID-19 Vaccine (1 - 2024-25 season) Never done         Health Maintenance Topics with due status: Not Due       Topic Last Completion Date    Influenza Vaccine 10/26/2022    Pap Smear 10/17/2023    Lipid Panel 05/19/2025    Hemoglobin A1c 05/19/2025    RSV Vaccine (Age 60+ and Pregnant patients) Not Due         Assessment and Plan   Routine general medical examination at a health care facility    Screening for hyperlipidemia  -     Lipid Panel; Future; Expected date: 07/31/2025    Screening for diabetes mellitus  -     Glucose, Random; Future; Expected date: 07/31/2025    Essential hypertension  -     losartan (COZAAR) 25 MG tablet; Take 1 tablet (25 mg total) by mouth once daily.  Dispense: 90 tablet; Refill: 0    Hyperlipidemia, unspecified hyperlipidemia type    Type 2 diabetes mellitus without complication, without long-term current use of insulin  -     glimepiride (AMARYL) 1 MG tablet; Take 1 tablet (1 mg total) by mouth before breakfast.  Dispense: 90 tablet; Refill: 0  -     Hemoglobin A1C; Future; Expected date: 08/18/2025  -     losartan (COZAAR) 25 MG tablet; Take 1 tablet (25 mg total) by mouth once daily.  Dispense: 90 tablet; Refill: 0    Class 2 severe obesity due to excess calories with serious comorbidity and body mass index (BMI) of 35.0 to 35.9 in adult    Current every day smoker    Anxiety  -     EScitalopram oxalate (LEXAPRO) 10 MG tablet; Take 1 tablet (10 mg total) by mouth once daily.  Dispense: 90 tablet; Refill: 0    Depression, unspecified depression type  -     EScitalopram oxalate (LEXAPRO) 10 MG tablet; Take 1 tablet (10 mg total) by mouth once  daily.  Dispense: 90 tablet; Refill: 0    Gastroesophageal reflux disease, unspecified whether esophagitis present  -     omeprazole (PRILOSEC) 20 MG capsule; Take 1 capsule (20 mg total) by mouth once daily.  Dispense: 90 capsule; Refill: 0          Patient Instructions  Patient Instructions   A1c will be due 8/18/25, come by clinic at your convenience for this to be obtained   Start Losartan 25 mg daily   Check blood pressure in the am and again in the afternoon, keep written log and bring in when you come for you A1c to be obtained     Refills provided on routine medications       [employer] HY in one year     Routine medication refill and follow up in three months 10/22/2025    Blood glucose   Fasting goal   Post prandial (two hours after eating)  goal <180  A1c goal <7  Recommend 45-60 grams of carbohydrates per meal   Follow plate method, avoiding sugar sweetened drinks and fruit juice; avoid foods high in concentrated sugars   Do not skip meals  Exercise 30 minutes daily for at least 5 days per week        Health goals to improve overall health and well-being:  BMI < 30 - lose 1-2 lbs per week, healthy/DASH diet, exercise at least 5 days per week  fasting glucose < 100; if applicable A1c < 5.7%  SBP < 130 & DBP < 80  LDL chol < 100                                [1]   Social History  Tobacco Use    Smoking status: Every Day     Current packs/day: 0.50     Average packs/day: 0.5 packs/day for 10.6 years (5.3 ttl pk-yrs)     Types: Cigarettes     Start date: 2015     Passive exposure: Current    Smokeless tobacco: Never    Tobacco comments:     Quit smoking 2 months ago.   Substance Use Topics    Alcohol use: Yes     Comment: Social    Drug use: Yes     Types: Marijuana   [2]   Current Outpatient Medications:     atorvastatin (LIPITOR) 10 MG tablet, Take 1 tablet (10 mg total) by mouth once daily., Disp: 90 tablet, Rfl: 3    estrogens, conjugated, (PREMARIN) 0.625 MG tablet, Take 1 tablet (0.625 mg total) by  mouth once daily., Disp: 30 tablet, Rfl: 11    blood sugar diagnostic Strp, Use one test strip to check blood glucose BID, Disp: 200 strip, Rfl: 3    blood-glucose meter kit, Use as instructed to check blood glucose, Disp: 1 each, Rfl: 0    EScitalopram oxalate (LEXAPRO) 10 MG tablet, Take 1 tablet (10 mg total) by mouth once daily., Disp: 90 tablet, Rfl: 0    glimepiride (AMARYL) 1 MG tablet, Take 1 tablet (1 mg total) by mouth before breakfast., Disp: 90 tablet, Rfl: 0    losartan (COZAAR) 25 MG tablet, Take 1 tablet (25 mg total) by mouth once daily., Disp: 90 tablet, Rfl: 0    omeprazole (PRILOSEC) 20 MG capsule, Take 1 capsule (20 mg total) by mouth once daily., Disp: 90 capsule, Rfl: 0

## 2025-08-01 ENCOUNTER — PATIENT OUTREACH (OUTPATIENT)
Facility: HOSPITAL | Age: 35
End: 2025-08-01

## 2025-08-01 NOTE — PROGRESS NOTES
Population Health Chart Review & Patient Outreach Details      Further Action Needed If Patient Returns Outreach:        Health Maintenance Due   Topic Date Due    Hepatitis C Screening  Never done    Diabetes Urine Screening  Never done    Foot Exam  Never done    Diabetic Eye Exam  Never done    HIV Screening  Never done    TETANUS VACCINE  Never done    COVID-19 Vaccine ( season) Never done          Updates Requested / Reviewed:     []  Care Everywhere    []     []  External Sources ([facility], [facility], [facility], etc.)    [] [facility]   [] [facility]   [] Other:    []  Care Team Updated   []  Removed  or Duplicate Orders   []  Immunization Reconciliation Completed / Queried    [] [place]   [] [place]   [] [place]   [] [place]      Health Maintenance Topics Addressed and Outreach Outcomes / Actions Taken:             Breast Cancer Screening []  Mammogram Order Placed    []  Mammogram Screening Scheduled    []  External Records Requested & Care Team Updated if Applicable    []  External Records Uploaded & Care Team Updated if Applicable    []  Pt Declined Scheduling Mammogram    []  Pt Will Schedule with External Provider / Order Routed & Care Team Updated if Applicable              Cervical Cancer Screening []  Pap Smear Scheduled in Primary Care or OBGYN    []  External Records Requested & Care Team Updated if Applicable       []  External Records Uploaded, Care Team Updated, & History Updated if Applicable    []  Patient Declined Scheduling Pap Smear    []  Patient Will Schedule with External Provider & Care Team Updated if Applicable                  Colorectal Cancer Screening []  Colonoscopy Case Request / Referral / Home Test Order Placed    []  External Records Requested & Care Team Updated if Applicable    []  External Records Uploaded, Care Team Updated, & History Updated if Applicable    []  Patient Declined Completing Colon Cancer Screening    []  Patient Will Schedule with External  Provider & Care Team Updated if Applicable    []  Fit Kit Mailed (add the SmartPhrase under additional notes)    []  Reminded Patient to Complete Home Test                Diabetic Eye Exam []  Eye Exam Screening Order Placed    []  Eye Camera Scheduled or Optometry/Ophthalmology Referral Placed    []  External Records Requested & Care Team Updated if Applicable    []  External Records Uploaded, Care Team Updated, & History Updated if Applicable    []  Patient Declined Scheduling Eye Exam    []  Patient Will Schedule with External Provider & Care Team Updated if Applicable             Blood Pressure Control []  Primary Care Follow Up Visit Scheduled     []  Remote Blood Pressure Reading Captured    []  Patient Declined Remote Reading or Scheduling Appt - Escalated to PCP    []  Patient Will Call Back or Send Portal Message with Reading                 HbA1c & Other Labs []  Overdue Lab(s) Ordered    []  Overdue Lab(s) Scheduled    []  External Records Uploaded & Care Team Updated if Applicable    []  Primary Care Follow Up Visit Scheduled     []  Reminded Patient to Complete A1c Home Test    []  Patient Declined Scheduling Labs or Will Call Back to Schedule    []  Patient Will Schedule with External Provider / Order Routed, & Care Team Updated if Applicable           Primary Care Appointment []  Primary Care Appt Scheduled    []  Patient Declined Scheduling or Will Call Back to Schedule    []  Pt Established with External Provider, Updated Care Team, & Informed Pt to Notify Payor if Applicable           Medication Adherence /    Statin Use []  Primary Care Appointment Scheduled    []  Patient Reminded to  Prescription    []  Patient Declined, Provider Notified if Needed    []  Sent Provider Message to Review to Evaluate Pt for Statin, Add Exclusion Dx Codes, Document   Exclusion in Problem List, Change Statin Intensity Level to Moderate or High Intensity if Applicable                Osteoporosis Screening []   Dexa Order Placed    []  Dexa Appointment Scheduled    []  External Records Requested & Care Team Updated    []  External Records Uploaded, Care Team Updated, & History Updated if Applicable    []  Patient Declined Scheduling Dexa or Will Call Back to Schedule    []  Patient Will Schedule with External Provider / Order Routed & Care Team Updated if Applicable       Additional Notes:       Post visit Population Health review of encounter with date of service  7/31/25 with [facility].  Not all required HY components in encounter.  Wellness plan entered manually in [employer] website. Labs in as future message sent.  Followup appt for: 8/3/26 HY  7/31/25 HY future labs due on 8/14/25  Received: Today  Elaina Hernandez LPN [facility] Staff  Patient failed to have labs drawn at 7/31/25 HY visit.  Please contact patient to return prior to end of day 8/14/25 to avoid claim denial.  Elaina Romano

## 2025-08-15 ENCOUNTER — PATIENT MESSAGE (OUTPATIENT)
Dept: FAMILY MEDICINE | Facility: CLINIC | Age: 35
End: 2025-08-15

## (undated) DEVICE — SOL IRRIGATION SALINE 0.9% 1000ML BOTTLE

## (undated) DEVICE — WARMER SCOPE DISP

## (undated) DEVICE — SOL IRRIGATION SALINE 3000ML BAG

## (undated) DEVICE — GLOVE SURGICAL PROTEXIS PI SIZE 6.5

## (undated) DEVICE — Device

## (undated) DEVICE — GLOVE SURGICAL PROTEXIS PI BLUE SIZE 7.0

## (undated) DEVICE — TUBING INSUFFLATION HI-FLOW 20L

## (undated) DEVICE — GLOVE SURGICAL PROTEXIS PI BLUE SIZE 7.5

## (undated) DEVICE — GLOVE SURGICAL PROTEXIS PI SIZE 6

## (undated) DEVICE — GLOVE SURGICAL PROTEXIS PI BLUE SIZE 6.5

## (undated) DEVICE — LAPARSCOPIC LIGASURE BLUNT TIP SEALER

## (undated) DEVICE — APPLICATOR CHLORAPREP HI-LITE TINTED ORANGE 26ML

## (undated) DEVICE — IRRIGATOR SUCTION STYKERFLOW II DISP

## (undated) DEVICE — TRAY FOLEY CATH 16FR SILICONE LUBRI-SIL W/DRAINAGE BAG

## (undated) DEVICE — GLOVE SURGICAL PROTEXIS PI SIZE 7

## (undated) DEVICE — SUTURE VICRYL 0 CT1 UNDYED 36"

## (undated) DEVICE — CDS GYN LAPAROSCOPY

## (undated) DEVICE — PLUME AWAY SMOKE EVACUTATION

## (undated) DEVICE — SUTURE VICRYL 0 27 CT CR 8

## (undated) DEVICE — TUBING SUCTION 5MM STERILE 3/16IN X 12FT

## (undated) DEVICE — GOWN SURGICAL SMARTGOWN LEVEL 4 / EXTRA LARGE STERILE

## (undated) DEVICE — PENCIL HANDSWITCHING ROCKER SW